# Patient Record
Sex: FEMALE | Race: WHITE | NOT HISPANIC OR LATINO | Employment: FULL TIME
[De-identification: names, ages, dates, MRNs, and addresses within clinical notes are randomized per-mention and may not be internally consistent; named-entity substitution may affect disease eponyms.]

---

## 2017-10-15 ENCOUNTER — HEALTH MAINTENANCE LETTER (OUTPATIENT)
Age: 37
End: 2017-10-15

## 2018-02-09 ENCOUNTER — ANESTHESIA (OUTPATIENT)
Dept: SURGERY | Facility: CLINIC | Age: 38
End: 2018-02-09
Payer: COMMERCIAL

## 2018-02-09 ENCOUNTER — ANESTHESIA EVENT (OUTPATIENT)
Dept: SURGERY | Facility: CLINIC | Age: 38
End: 2018-02-09
Payer: COMMERCIAL

## 2018-02-09 ENCOUNTER — APPOINTMENT (OUTPATIENT)
Dept: CT IMAGING | Facility: CLINIC | Age: 38
End: 2018-02-09
Attending: EMERGENCY MEDICINE
Payer: COMMERCIAL

## 2018-02-09 ENCOUNTER — APPOINTMENT (OUTPATIENT)
Dept: ULTRASOUND IMAGING | Facility: CLINIC | Age: 38
End: 2018-02-09
Attending: EMERGENCY MEDICINE
Payer: COMMERCIAL

## 2018-02-09 ENCOUNTER — HOSPITAL ENCOUNTER (OUTPATIENT)
Facility: CLINIC | Age: 38
Discharge: HOME | End: 2018-02-09
Attending: EMERGENCY MEDICINE | Admitting: OBSTETRICS & GYNECOLOGY
Payer: COMMERCIAL

## 2018-02-09 VITALS
OXYGEN SATURATION: 94 % | BODY MASS INDEX: 31.41 KG/M2 | WEIGHT: 184 LBS | SYSTOLIC BLOOD PRESSURE: 104 MMHG | TEMPERATURE: 98.6 F | HEART RATE: 76 BPM | HEIGHT: 64 IN | DIASTOLIC BLOOD PRESSURE: 71 MMHG | RESPIRATION RATE: 16 BRPM

## 2018-02-09 DIAGNOSIS — Z98.890 S/P LAPAROSCOPY: Primary | ICD-10-CM

## 2018-02-09 DIAGNOSIS — R10.31 RLQ ABDOMINAL PAIN: ICD-10-CM

## 2018-02-09 DIAGNOSIS — N83.8 OVARIAN ENLARGEMENT, RIGHT: ICD-10-CM

## 2018-02-09 LAB
ALBUMIN SERPL-MCNC: 4.1 G/DL (ref 3.4–5)
ALBUMIN UR-MCNC: NEGATIVE MG/DL
ALP SERPL-CCNC: 58 U/L (ref 40–150)
ALT SERPL W P-5'-P-CCNC: 17 U/L (ref 0–50)
ANION GAP SERPL CALCULATED.3IONS-SCNC: 9 MMOL/L (ref 3–14)
APPEARANCE UR: CLEAR
AST SERPL W P-5'-P-CCNC: 12 U/L (ref 0–45)
BASOPHILS # BLD AUTO: 0 10E9/L (ref 0–0.2)
BASOPHILS NFR BLD AUTO: 0.2 %
BILIRUB SERPL-MCNC: 0.4 MG/DL (ref 0.2–1.3)
BILIRUB UR QL STRIP: NEGATIVE
BUN SERPL-MCNC: 10 MG/DL (ref 7–30)
CALCIUM SERPL-MCNC: 8.7 MG/DL (ref 8.5–10.1)
CHLORIDE SERPL-SCNC: 106 MMOL/L (ref 94–109)
CO2 SERPL-SCNC: 22 MMOL/L (ref 20–32)
COLOR UR AUTO: NORMAL
CREAT SERPL-MCNC: 0.64 MG/DL (ref 0.52–1.04)
DIFFERENTIAL METHOD BLD: NORMAL
EOSINOPHIL # BLD AUTO: 0.1 10E9/L (ref 0–0.7)
EOSINOPHIL NFR BLD AUTO: 2.5 %
ERYTHROCYTE [DISTWIDTH] IN BLOOD BY AUTOMATED COUNT: 13.1 % (ref 10–15)
GFR SERPL CREATININE-BSD FRML MDRD: >90 ML/MIN/1.7M2
GLUCOSE SERPL-MCNC: 102 MG/DL (ref 70–99)
GLUCOSE UR STRIP-MCNC: NEGATIVE MG/DL
HCG UR QL: NEGATIVE
HCT VFR BLD AUTO: 37.8 % (ref 35–47)
HGB BLD-MCNC: 13.3 G/DL (ref 11.7–15.7)
HGB UR QL STRIP: NEGATIVE
IMM GRANULOCYTES # BLD: 0 10E9/L (ref 0–0.4)
IMM GRANULOCYTES NFR BLD: 0.2 %
KETONES UR STRIP-MCNC: NEGATIVE MG/DL
LEUKOCYTE ESTERASE UR QL STRIP: NEGATIVE
LYMPHOCYTES # BLD AUTO: 1.4 10E9/L (ref 0.8–5.3)
LYMPHOCYTES NFR BLD AUTO: 26 %
MCH RBC QN AUTO: 32.4 PG (ref 26.5–33)
MCHC RBC AUTO-ENTMCNC: 35.2 G/DL (ref 31.5–36.5)
MCV RBC AUTO: 92 FL (ref 78–100)
MONOCYTES # BLD AUTO: 0.5 10E9/L (ref 0–1.3)
MONOCYTES NFR BLD AUTO: 9.4 %
NEUTROPHILS # BLD AUTO: 3.2 10E9/L (ref 1.6–8.3)
NEUTROPHILS NFR BLD AUTO: 61.7 %
NITRATE UR QL: NEGATIVE
NRBC # BLD AUTO: 0 10*3/UL
NRBC BLD AUTO-RTO: 0 /100
PH UR STRIP: 6 PH (ref 5–7)
PLATELET # BLD AUTO: 210 10E9/L (ref 150–450)
POTASSIUM SERPL-SCNC: 3.9 MMOL/L (ref 3.4–5.3)
PROT SERPL-MCNC: 7.5 G/DL (ref 6.8–8.8)
RBC # BLD AUTO: 4.11 10E12/L (ref 3.8–5.2)
SODIUM SERPL-SCNC: 137 MMOL/L (ref 133–144)
SOURCE: NORMAL
SP GR UR STRIP: 1 (ref 1–1.03)
UROBILINOGEN UR STRIP-MCNC: NORMAL MG/DL (ref 0–2)
WBC # BLD AUTO: 5.2 10E9/L (ref 4–11)

## 2018-02-09 PROCEDURE — 37000008 ZZH ANESTHESIA TECHNICAL FEE, 1ST 30 MIN: Performed by: OBSTETRICS & GYNECOLOGY

## 2018-02-09 PROCEDURE — 25000128 H RX IP 250 OP 636: Performed by: EMERGENCY MEDICINE

## 2018-02-09 PROCEDURE — 25800025 ZZH RX 258: Performed by: OBSTETRICS & GYNECOLOGY

## 2018-02-09 PROCEDURE — 37000009 ZZH ANESTHESIA TECHNICAL FEE, EACH ADDTL 15 MIN: Performed by: OBSTETRICS & GYNECOLOGY

## 2018-02-09 PROCEDURE — 27210794 ZZH OR GENERAL SUPPLY STERILE: Performed by: OBSTETRICS & GYNECOLOGY

## 2018-02-09 PROCEDURE — 96361 HYDRATE IV INFUSION ADD-ON: CPT | Mod: 91

## 2018-02-09 PROCEDURE — 40000170 ZZH STATISTIC PRE-PROCEDURE ASSESSMENT II: Performed by: OBSTETRICS & GYNECOLOGY

## 2018-02-09 PROCEDURE — 81003 URINALYSIS AUTO W/O SCOPE: CPT | Performed by: EMERGENCY MEDICINE

## 2018-02-09 PROCEDURE — 88305 TISSUE EXAM BY PATHOLOGIST: CPT | Mod: 26 | Performed by: OBSTETRICS & GYNECOLOGY

## 2018-02-09 PROCEDURE — 80053 COMPREHEN METABOLIC PANEL: CPT | Performed by: EMERGENCY MEDICINE

## 2018-02-09 PROCEDURE — 25000125 ZZHC RX 250: Performed by: OBSTETRICS & GYNECOLOGY

## 2018-02-09 PROCEDURE — 71000027 ZZH RECOVERY PHASE 2 EACH 15 MINS: Performed by: OBSTETRICS & GYNECOLOGY

## 2018-02-09 PROCEDURE — 96375 TX/PRO/DX INJ NEW DRUG ADDON: CPT

## 2018-02-09 PROCEDURE — 25000128 H RX IP 250 OP 636: Performed by: ANESTHESIOLOGY

## 2018-02-09 PROCEDURE — 25000128 H RX IP 250 OP 636: Performed by: OBSTETRICS & GYNECOLOGY

## 2018-02-09 PROCEDURE — 81025 URINE PREGNANCY TEST: CPT | Performed by: EMERGENCY MEDICINE

## 2018-02-09 PROCEDURE — 36000056 ZZH SURGERY LEVEL 3 1ST 30 MIN: Performed by: OBSTETRICS & GYNECOLOGY

## 2018-02-09 PROCEDURE — 71000012 ZZH RECOVERY PHASE 1 LEVEL 1 FIRST HR: Performed by: OBSTETRICS & GYNECOLOGY

## 2018-02-09 PROCEDURE — 96374 THER/PROPH/DIAG INJ IV PUSH: CPT | Mod: 59

## 2018-02-09 PROCEDURE — 99285 EMERGENCY DEPT VISIT HI MDM: CPT | Mod: 25

## 2018-02-09 PROCEDURE — 74176 CT ABD & PELVIS W/O CONTRAST: CPT

## 2018-02-09 PROCEDURE — 25000125 ZZHC RX 250: Performed by: NURSE ANESTHETIST, CERTIFIED REGISTERED

## 2018-02-09 PROCEDURE — 85025 COMPLETE CBC W/AUTO DIFF WBC: CPT | Performed by: EMERGENCY MEDICINE

## 2018-02-09 PROCEDURE — 27210995 ZZH RX 272: Performed by: OBSTETRICS & GYNECOLOGY

## 2018-02-09 PROCEDURE — 76830 TRANSVAGINAL US NON-OB: CPT

## 2018-02-09 PROCEDURE — 25000565 ZZH ISOFLURANE, EA 15 MIN: Performed by: OBSTETRICS & GYNECOLOGY

## 2018-02-09 PROCEDURE — 71000013 ZZH RECOVERY PHASE 1 LEVEL 1 EA ADDTL HR: Performed by: OBSTETRICS & GYNECOLOGY

## 2018-02-09 PROCEDURE — 25000128 H RX IP 250 OP 636: Performed by: NURSE ANESTHETIST, CERTIFIED REGISTERED

## 2018-02-09 PROCEDURE — 36000058 ZZH SURGERY LEVEL 3 EA 15 ADDTL MIN: Performed by: OBSTETRICS & GYNECOLOGY

## 2018-02-09 PROCEDURE — 88305 TISSUE EXAM BY PATHOLOGIST: CPT | Performed by: OBSTETRICS & GYNECOLOGY

## 2018-02-09 RX ORDER — LIDOCAINE HYDROCHLORIDE 20 MG/ML
INJECTION, SOLUTION INFILTRATION; PERINEURAL PRN
Status: DISCONTINUED | OUTPATIENT
Start: 2018-02-09 | End: 2018-02-09

## 2018-02-09 RX ORDER — ACETAMINOPHEN 10 MG/ML
1000 INJECTION, SOLUTION INTRAVENOUS ONCE
Status: COMPLETED | OUTPATIENT
Start: 2018-02-09 | End: 2018-02-09

## 2018-02-09 RX ORDER — ONDANSETRON 2 MG/ML
INJECTION INTRAMUSCULAR; INTRAVENOUS PRN
Status: DISCONTINUED | OUTPATIENT
Start: 2018-02-09 | End: 2018-02-09

## 2018-02-09 RX ORDER — SODIUM CHLORIDE 9 MG/ML
1000 INJECTION, SOLUTION INTRAVENOUS CONTINUOUS
Status: DISCONTINUED | OUTPATIENT
Start: 2018-02-09 | End: 2018-02-09 | Stop reason: HOSPADM

## 2018-02-09 RX ORDER — SODIUM CHLORIDE, SODIUM LACTATE, POTASSIUM CHLORIDE, AND CALCIUM CHLORIDE .6; .31; .03; .02 G/100ML; G/100ML; G/100ML; G/100ML
IRRIGANT IRRIGATION PRN
Status: DISCONTINUED | OUTPATIENT
Start: 2018-02-09 | End: 2018-02-09 | Stop reason: HOSPADM

## 2018-02-09 RX ORDER — VECURONIUM BROMIDE 1 MG/ML
INJECTION, POWDER, LYOPHILIZED, FOR SOLUTION INTRAVENOUS PRN
Status: DISCONTINUED | OUTPATIENT
Start: 2018-02-09 | End: 2018-02-09

## 2018-02-09 RX ORDER — KETOROLAC TROMETHAMINE 30 MG/ML
INJECTION, SOLUTION INTRAMUSCULAR; INTRAVENOUS PRN
Status: DISCONTINUED | OUTPATIENT
Start: 2018-02-09 | End: 2018-02-09

## 2018-02-09 RX ORDER — HYDROCODONE BITARTRATE AND ACETAMINOPHEN 5; 325 MG/1; MG/1
1-2 TABLET ORAL EVERY 4 HOURS PRN
Qty: 20 TABLET | Refills: 0 | Status: SHIPPED | OUTPATIENT
Start: 2018-02-09 | End: 2023-11-07

## 2018-02-09 RX ORDER — ONDANSETRON 2 MG/ML
4 INJECTION INTRAMUSCULAR; INTRAVENOUS ONCE
Status: COMPLETED | OUTPATIENT
Start: 2018-02-09 | End: 2018-02-09

## 2018-02-09 RX ORDER — SODIUM CHLORIDE, SODIUM LACTATE, POTASSIUM CHLORIDE, CALCIUM CHLORIDE 600; 310; 30; 20 MG/100ML; MG/100ML; MG/100ML; MG/100ML
INJECTION, SOLUTION INTRAVENOUS CONTINUOUS
Status: DISCONTINUED | OUTPATIENT
Start: 2018-02-09 | End: 2018-02-09 | Stop reason: HOSPADM

## 2018-02-09 RX ORDER — MAGNESIUM HYDROXIDE 1200 MG/15ML
LIQUID ORAL PRN
Status: DISCONTINUED | OUTPATIENT
Start: 2018-02-09 | End: 2018-02-09 | Stop reason: HOSPADM

## 2018-02-09 RX ORDER — OXYCODONE HYDROCHLORIDE 5 MG/1
5 TABLET ORAL
Status: DISCONTINUED | OUTPATIENT
Start: 2018-02-09 | End: 2018-02-09 | Stop reason: HOSPADM

## 2018-02-09 RX ORDER — NALOXONE HYDROCHLORIDE 0.4 MG/ML
.1-.4 INJECTION, SOLUTION INTRAMUSCULAR; INTRAVENOUS; SUBCUTANEOUS
Status: DISCONTINUED | OUTPATIENT
Start: 2018-02-09 | End: 2018-02-09 | Stop reason: HOSPADM

## 2018-02-09 RX ORDER — FENTANYL CITRATE 0.05 MG/ML
25-50 INJECTION, SOLUTION INTRAMUSCULAR; INTRAVENOUS
Status: DISCONTINUED | OUTPATIENT
Start: 2018-02-09 | End: 2018-02-09 | Stop reason: HOSPADM

## 2018-02-09 RX ORDER — MORPHINE SULFATE 2 MG/ML
2 INJECTION, SOLUTION INTRAMUSCULAR; INTRAVENOUS
Status: DISCONTINUED | OUTPATIENT
Start: 2018-02-09 | End: 2018-02-09 | Stop reason: HOSPADM

## 2018-02-09 RX ORDER — GLYCOPYRROLATE 0.2 MG/ML
INJECTION, SOLUTION INTRAMUSCULAR; INTRAVENOUS PRN
Status: DISCONTINUED | OUTPATIENT
Start: 2018-02-09 | End: 2018-02-09

## 2018-02-09 RX ORDER — PROPOFOL 10 MG/ML
INJECTION, EMULSION INTRAVENOUS PRN
Status: DISCONTINUED | OUTPATIENT
Start: 2018-02-09 | End: 2018-02-09

## 2018-02-09 RX ORDER — ONDANSETRON 2 MG/ML
4 INJECTION INTRAMUSCULAR; INTRAVENOUS EVERY 30 MIN PRN
Status: DISCONTINUED | OUTPATIENT
Start: 2018-02-09 | End: 2018-02-09 | Stop reason: HOSPADM

## 2018-02-09 RX ORDER — HYDROMORPHONE HYDROCHLORIDE 1 MG/ML
.3-.5 INJECTION, SOLUTION INTRAMUSCULAR; INTRAVENOUS; SUBCUTANEOUS EVERY 5 MIN PRN
Status: DISCONTINUED | OUTPATIENT
Start: 2018-02-09 | End: 2018-02-09 | Stop reason: HOSPADM

## 2018-02-09 RX ORDER — BUPIVACAINE HYDROCHLORIDE 5 MG/ML
INJECTION, SOLUTION PERINEURAL PRN
Status: DISCONTINUED | OUTPATIENT
Start: 2018-02-09 | End: 2018-02-09 | Stop reason: HOSPADM

## 2018-02-09 RX ORDER — DEXAMETHASONE SODIUM PHOSPHATE 4 MG/ML
INJECTION, SOLUTION INTRA-ARTICULAR; INTRALESIONAL; INTRAMUSCULAR; INTRAVENOUS; SOFT TISSUE PRN
Status: DISCONTINUED | OUTPATIENT
Start: 2018-02-09 | End: 2018-02-09

## 2018-02-09 RX ORDER — ONDANSETRON 4 MG/1
4 TABLET, ORALLY DISINTEGRATING ORAL EVERY 30 MIN PRN
Status: DISCONTINUED | OUTPATIENT
Start: 2018-02-09 | End: 2018-02-09 | Stop reason: HOSPADM

## 2018-02-09 RX ORDER — ONDANSETRON 4 MG/1
4 TABLET, ORALLY DISINTEGRATING ORAL
Status: DISCONTINUED | OUTPATIENT
Start: 2018-02-09 | End: 2018-02-09 | Stop reason: HOSPADM

## 2018-02-09 RX ORDER — KETOROLAC TROMETHAMINE 15 MG/ML
15 INJECTION, SOLUTION INTRAMUSCULAR; INTRAVENOUS ONCE
Status: COMPLETED | OUTPATIENT
Start: 2018-02-09 | End: 2018-02-09

## 2018-02-09 RX ORDER — NEOSTIGMINE METHYLSULFATE 1 MG/ML
VIAL (ML) INJECTION PRN
Status: DISCONTINUED | OUTPATIENT
Start: 2018-02-09 | End: 2018-02-09

## 2018-02-09 RX ORDER — FENTANYL CITRATE 50 UG/ML
INJECTION, SOLUTION INTRAMUSCULAR; INTRAVENOUS PRN
Status: DISCONTINUED | OUTPATIENT
Start: 2018-02-09 | End: 2018-02-09

## 2018-02-09 RX ADMIN — LIDOCAINE HYDROCHLORIDE 80 MG: 20 INJECTION, SOLUTION INFILTRATION; PERINEURAL at 12:46

## 2018-02-09 RX ADMIN — SUCCINYLCHOLINE CHLORIDE 100 MG: 20 INJECTION, SOLUTION INTRAMUSCULAR; INTRAVENOUS; PARENTERAL at 12:46

## 2018-02-09 RX ADMIN — SODIUM CHLORIDE, POTASSIUM CHLORIDE, SODIUM LACTATE AND CALCIUM CHLORIDE: 600; 310; 30; 20 INJECTION, SOLUTION INTRAVENOUS at 12:06

## 2018-02-09 RX ADMIN — GLYCOPYRROLATE 0.6 MG: 0.2 INJECTION, SOLUTION INTRAMUSCULAR; INTRAVENOUS at 14:00

## 2018-02-09 RX ADMIN — ONDANSETRON 4 MG: 2 INJECTION INTRAMUSCULAR; INTRAVENOUS at 17:26

## 2018-02-09 RX ADMIN — HYDROMORPHONE HYDROCHLORIDE 0.5 MG: 1 INJECTION, SOLUTION INTRAMUSCULAR; INTRAVENOUS; SUBCUTANEOUS at 15:16

## 2018-02-09 RX ADMIN — KETOROLAC TROMETHAMINE 15 MG: 15 INJECTION, SOLUTION INTRAMUSCULAR; INTRAVENOUS at 10:19

## 2018-02-09 RX ADMIN — MIDAZOLAM 2 MG: 1 INJECTION INTRAMUSCULAR; INTRAVENOUS at 12:46

## 2018-02-09 RX ADMIN — ONDANSETRON 4 MG: 2 INJECTION INTRAMUSCULAR; INTRAVENOUS at 14:01

## 2018-02-09 RX ADMIN — PROPOFOL 200 MG: 10 INJECTION, EMULSION INTRAVENOUS at 12:46

## 2018-02-09 RX ADMIN — NEOSTIGMINE METHYLSULFATE 4 MG: 1 INJECTION INTRAMUSCULAR; INTRAVENOUS; SUBCUTANEOUS at 14:00

## 2018-02-09 RX ADMIN — VECURONIUM BROMIDE 3 MG: 1 INJECTION, POWDER, LYOPHILIZED, FOR SOLUTION INTRAVENOUS at 12:56

## 2018-02-09 RX ADMIN — SODIUM CHLORIDE 1000 ML: 9 INJECTION, SOLUTION INTRAVENOUS at 11:33

## 2018-02-09 RX ADMIN — SODIUM CHLORIDE, POTASSIUM CHLORIDE, SODIUM LACTATE AND CALCIUM CHLORIDE: 600; 310; 30; 20 INJECTION, SOLUTION INTRAVENOUS at 13:24

## 2018-02-09 RX ADMIN — SODIUM CHLORIDE, POTASSIUM CHLORIDE, SODIUM LACTATE AND CALCIUM CHLORIDE: 600; 310; 30; 20 INJECTION, SOLUTION INTRAVENOUS at 12:42

## 2018-02-09 RX ADMIN — FENTANYL CITRATE 50 MCG: 50 INJECTION, SOLUTION INTRAMUSCULAR; INTRAVENOUS at 17:03

## 2018-02-09 RX ADMIN — ONDANSETRON 4 MG: 2 INJECTION INTRAMUSCULAR; INTRAVENOUS at 08:04

## 2018-02-09 RX ADMIN — DEXAMETHASONE SODIUM PHOSPHATE 4 MG: 4 INJECTION, SOLUTION INTRA-ARTICULAR; INTRALESIONAL; INTRAMUSCULAR; INTRAVENOUS; SOFT TISSUE at 13:00

## 2018-02-09 RX ADMIN — FENTANYL CITRATE 50 MCG: 50 INJECTION, SOLUTION INTRAMUSCULAR; INTRAVENOUS at 13:45

## 2018-02-09 RX ADMIN — HYDROMORPHONE HYDROCHLORIDE 0.5 MG: 1 INJECTION, SOLUTION INTRAMUSCULAR; INTRAVENOUS; SUBCUTANEOUS at 15:47

## 2018-02-09 RX ADMIN — KETOROLAC TROMETHAMINE 30 MG: 30 INJECTION, SOLUTION INTRAMUSCULAR at 14:01

## 2018-02-09 RX ADMIN — HYDROMORPHONE HYDROCHLORIDE 0.5 MG: 1 INJECTION, SOLUTION INTRAMUSCULAR; INTRAVENOUS; SUBCUTANEOUS at 14:35

## 2018-02-09 RX ADMIN — PROPOFOL 40 MG: 10 INJECTION, EMULSION INTRAVENOUS at 13:45

## 2018-02-09 RX ADMIN — SODIUM CHLORIDE 1000 ML: 9 INJECTION, SOLUTION INTRAVENOUS at 08:08

## 2018-02-09 RX ADMIN — ACETAMINOPHEN 1000 MG: 10 INJECTION, SOLUTION INTRAVENOUS at 16:07

## 2018-02-09 RX ADMIN — VECURONIUM BROMIDE 1 MG: 1 INJECTION, POWDER, LYOPHILIZED, FOR SOLUTION INTRAVENOUS at 13:25

## 2018-02-09 RX ADMIN — FENTANYL CITRATE 250 MCG: 50 INJECTION, SOLUTION INTRAMUSCULAR; INTRAVENOUS at 13:02

## 2018-02-09 RX ADMIN — FENTANYL CITRATE 100 MCG: 50 INJECTION, SOLUTION INTRAMUSCULAR; INTRAVENOUS at 12:46

## 2018-02-09 ASSESSMENT — ENCOUNTER SYMPTOMS
CONSTIPATION: 0
BLOOD IN STOOL: 0
DIARRHEA: 0
BACK PAIN: 1
ABDOMINAL PAIN: 1
SEIZURES: 0

## 2018-02-09 NOTE — IP AVS SNAPSHOT
MRN:[id number]                      After Visit Summary   2/9/2018    Cora Connell    MRN: [id number]           Thank you!     Thank you for choosing [facility] for your care. Our goal is always to provide you with excellent care. Hearing back from our patients is one way we can continue to improve our services. Please take a few minutes to complete the written survey that you may receive in the mail after you visit with us. Thank you!        Patient Information     Date Of Birth          1980        Designated Caregiver       Most Recent Value    Caregiver    Will someone help with your care after discharge? yes    Name of designated caregiver Timoteo-    Phone number of caregiver [phone]    Caregiver address [address]      About your hospital stay     You were admitted on:  N/A You last received care in the:  [facility] PACU    You were discharged on:  February 9, 2018       Who to Call     For medical emergencies, please call 911.  For non-urgent questions about your medical care, please call your primary care provider or clinic, [phone]  For questions related to your surgery, please call your surgery clinic        Attending Provider     Provider Specialty    Nikita Herring MD Emergency Medicine       Primary Care Provider Office Phone # Fax #    Loiad Bynum [phone]      After Care Instructions     Discharge Instructions       Resume pre procedure diet            Discharge Instructions       Patient to arrange follow up appointment in 2  weeks            Discharge Instructions       Stop turmeric for two weeks postoperatively            Dressing       Keep dressing clean and dry, change as instructed by Provider or RN            No alcohol       NO ALCOHOL for 24 hours post procedure            No driving or operating machinery       No driving or operating machinery until day after procedure            No  lifting       No lifting over 30 pounds and no strenuous physical activity.  For 2 weeks                  Further instructions from your care team         Same Day Surgery Discharge Instructions for  Sedation and General Anesthesia       It's not unusual to feel dizzy, light-headed or faint for up to 24 hours after surgery or while taking pain medication.  If you have these symptoms: sit for a few minutes before standing and have someone assist you when you get up to walk or use the bathroom.      You should rest and relax for the next 24 hours. We recommend you make arrangements to have an adult stay with you for at least 24 hours after your discharge.  Avoid hazardous and strenuous activity.      DO NOT DRIVE any vehicle or operate mechanical equipment for 24 hours following the end of your surgery.  Even though you may feel normal, your reactions may be affected by the medication you have received.      Do not drink alcoholic beverages for 24 hours following surgery.       Slowly progress to your regular diet as you feel able. It's not unusual to feel nauseated and/or vomit after receiving anesthesia.  If you develop these symptoms, drink clear liquids (apple juice, ginger ale, broth, 7-up, etc. ) until you feel better.  If your nausea and vomiting persists for 24 hours, please notify your surgeon.        All narcotic pain medications, along with inactivity and anesthesia, can cause constipation. Drinking plenty of liquids and increasing fiber intake will help.      For any questions of a medical nature, call your surgeon.      Do not make important decisions for 24 hours.      If you had general anesthesia, you may have a sore throat for a couple of days related to the breathing tube used during surgery.  You may use Cepacol lozenges to help with this discomfort.  If it worsens or if you develop a fever, contact your surgeon.       If you feel your pain is not well managed with the pain medications prescribed by  your surgeon, please contact your surgeon's office to let them know so they can address your concerns.     [facility]   Laparoscopy  Discharge Instructions    ACTIVITY:  You may restart normal activities as your abdominal discomfort disappears.  You may expect some discomfort under the ribs and shoulder area for the first 24 hours.  In nearly all cases, this will disappear shortly after the first day.  It is certainly permissible to climb stairs, shower, and do ordinary, quiet activities.  More vigorous activities such as sports, intercourse and work may be resumed in 48-72 hours as seems to befit your situation.    OFFICE VISIT:  Please call a day or two after your surgery to make an appointment in approximately 2 weeks to discuss the results of your surgery and have your check-up.    VAGINAL DISCHARGE:  You may have some bloody vaginal discharge for as long as one week.  Ordinary tampons may be used after 3-4 days.  Avoid super absorbent tampons.    TEMPERATURE:  If you develop a temperature elevation of 101  or higher, please call our office immediately.    RESTRICTIONS:  Due to the effects of general anesthesia, please do not drive a car, drink alcoholic beverages, nor operate complex machinery in the first 24 hours following surgery.    PLEASE FEEL FREE TO CALL OUR OFFICE IF ANY QUESTIONS OR PROBLEMS ARISE.    [facility]    Marcelino Bennett M.D.  Bernard Whitney M.D.   Benito Beckwith M.D.  KMIANI Carlin M.D.   Glenda Fernandez M.D.  Xiomara Torres M.D.  Aria Hines D.O.  Latasha Arceo M.D. KIMANI Ramey M.D.  _________________________________________________________________________________                   [facility]              [address]  [address] [address]  [address]            [facility] [address]  [place] [address]             "[phone] (Telephone)                                               [phone] (Telephone)            [phone] (Fax)  [phone] (Fax)            [facility]    **If you have questions or concerns about your procedure,  call Dr. Arceo at [phone]**      While you were at the hospital today you received Toradol, an antiinflammatory medication similar to Ibuprofen.  You should not take other antiinflammatory medication, such as Ibuprofen, Motrin, Advil, Aleve, Naprosyn, etc, until 8:00pm.       While you were at the hospital today you were given 1000 mg of Tylenol at 4pm. The recommended daily maximum dose is 4000 mg.       Pending Results     Date and Time Order Name Status Description    2018 1318 Surgical pathology exam In process             Admission Information     Date & Time Department Dept. Phone    2018 [facility] PACU [phone]      Your Vitals Were     Blood Pressure Pulse Temperature Respirations Height Weight    98/62 76 98.6  F (37  C) 10 1.626 m (5' 4\") 83.5 kg (184 lb)    Last Period Pulse Oximetry BMI (Body Mass Index)             2018 97% 31.58 kg/m2         [employer] Information     [employer] lets you send messages to your doctor, view your test results, renew your prescriptions, schedule appointments and more. To sign up, go to [email or web address] . Click on \"Log in\" on the left side of the screen, which will take you to the Welcome page. Then click on \"Sign up Now\" on the right side of the page.     You will be asked to enter the access code listed below, as well as some personal information. Please follow the directions to create your username and password.     Your access code is: [email or web address]  Expires: 5/10/2018  2:09 PM     Your access code will  in 90 days. If you need help or a new code, please call your [place] clinic or [phone].        Care " EveryWhere ID     This is your Care EveryWhere ID. This could be used by other organizations to access your [facility] medical records  [id number]        Equal Access to Services     ARIANNA LUCIA : Joshua Hernández, bibi brewer, vikasmarissa rodrigezanat quezada, paco stevenin hayaachitra chavezmarquez saravia tg acevedoMamadou So [facility] [phone].    ATENCIÓN: Si habla español, tiene a casanova disposición servicios gratuitos de asistencia lingüística. Llame al [phone].    We comply with applicable federal civil rights laws and [place] laws. We do not discriminate on the basis of race, color, national origin, age, disability, sex, sexual orientation, or gender identity.               Review of your medicines      START taking        Dose / Directions    HYDROcodone-acetaminophen 5-325 MG per tablet   Commonly known as:  NORCO   Used for:  S/P laparoscopy        Dose:  1-2 tablet   Take 1-2 tablets by mouth every 4 hours as needed for other (Moderate to Severe Pain)   Quantity:  20 tablet   Refills:  0         CONTINUE these medicines which have NOT CHANGED        Dose / Directions    calcium carbonate 1250 MG tablet   Commonly known as:  OS-RACIEL 500 mg [place]. [place]        Dose:  500 mg   Take 500 mg by mouth 2 times daily   Refills:  0       SERTRALINE HCL        Dose:  50 mg   50 mg   Refills:  0       TURMERIC PO        Take by mouth 2 times daily   Refills:  0            Where to get your medicines      Some of these will need a paper prescription and others can be bought over the counter. Ask your nurse if you have questions.     Bring a paper prescription for each of these medications     HYDROcodone-acetaminophen 5-325 MG per tablet                Protect others around you: Learn how to safely use, store and throw away your medicines at www.disposemymeds.org.        Information about OPIOIDS     PRESCRIPTION OPIOIDS: WHAT YOU NEED TO KNOW    Prescription opioids can be used to help relieve moderate to severe pain and are  often prescribed following a surgery or injury, or for certain health conditions. These medications can be an important part of treatment but also come with serious risks. It is important to work with your health care provider to make sure you are getting the safest, most effective care.    WHAT ARE THE RISKS AND SIDE EFFECTS OF OPIOID USE?  Prescription opioids carry serious risks of addiction and overdose, especially with prolonged use. An opioid overdose, often marked by slowed breathing can cause sudden death. The use of prescription opioids can have a number of side effects as well, even when taken as directed:      Tolerance - meaning you might need to take more of a medication for the same pain relief    Physical dependence - meaning you have symptoms of withdrawal when a medication is stopped    Increased sensitivity to pain    Constipation    Nausea, vomiting, and dry mouth    Sleepiness and dizziness    Confusion    Depression    Low levels of testosterone that can result in lower sex drive, energy, and strength    Itching and sweating    RISKS ARE GREATER WITH:    History of drug misuse, substance use disorder, or overdose    Mental health conditions (such as depression or anxiety)    Sleep apnea    Older age (65 years or older)    Pregnancy    Avoid alcohol while taking prescription opioids.   Also, unless specifically advised by your health care provider, medications to avoid include:    Benzodiazepines (such as Xanax or Valium)    Muscle relaxants (such as Soma or Flexeril)    Hypnotics (such as Ambien or Lunesta)    Other prescription opioids    KNOW YOUR OPTIONS:  Talk to your health care provider about ways to manage your pain that do not involve prescription opioids. Some of these options may actually work better and have fewer risks and side effects:    Pain relievers such as acetaminophen, ibuprofen, and naproxen    Some medications that are also used for depression or seizures    Physical  therapy and exercise    Cognitive behavioral therapy, a psychological, goal-directed approach, in which patients learn how to modify physical, behavioral, and emotional triggers of pain and stress    IF YOU ARE PRESCRIBED OPIOIDS FOR PAIN:    Never take opioids in greater amounts or more often than prescribed    Follow up with your primary health care provider and work together to create a plan on how to manage your pain.    Talk about ways to help manage your pain that do not involve prescription opioids    Talk about all concerns and side effects    Help prevent misuse and abuse    Never sell or share prescription opioids    Never use another person's prescription opioids    Store prescription opioids in a secure place and out of reach of others (this may include visitors, children, friends, and family)    Visit www.cdc.gov/drugoverdose to learn about risks of opioid abuse and overdose    If you believe you may be struggling with addiction, tell your health care provider and ask for guidance or call [facility]'s National Helpline at 5-636-418-HELP    LEARN MORE / www.cdc.gov/drugoverdose/prescribing/guideline.html    Safely dispose of unused prescription opioids: Find your local drug take-back programs and more information about the importance of safe disposal at www.doseofreality.[place].gov             Medication List: This is a list of all your medications and when to take them. Check marks below indicate your daily home schedule. Keep this list as a reference.      Medications           Morning Afternoon Evening Bedtime As Needed    calcium carbonate 1250 MG tablet   Commonly known as:  OS-RACIEL 500 mg [place]. [place]   Take 500 mg by mouth 2 times daily                                HYDROcodone-acetaminophen 5-325 MG per tablet   Commonly known as:  NORCO   Take 1-2 tablets by mouth every 4 hours as needed for other (Moderate to Severe Pain)                                SERTRALINE HCL   50 mg                                 TURMERIC PO   Take by mouth 2 times daily

## 2018-02-09 NOTE — DISCHARGE INSTRUCTIONS
Same Day Surgery Discharge Instructions for  Sedation and General Anesthesia       It's not unusual to feel dizzy, light-headed or faint for up to 24 hours after surgery or while taking pain medication.  If you have these symptoms: sit for a few minutes before standing and have someone assist you when you get up to walk or use the bathroom.      You should rest and relax for the next 24 hours. We recommend you make arrangements to have an adult stay with you for at least 24 hours after your discharge.  Avoid hazardous and strenuous activity.      DO NOT DRIVE any vehicle or operate mechanical equipment for 24 hours following the end of your surgery.  Even though you may feel normal, your reactions may be affected by the medication you have received.      Do not drink alcoholic beverages for 24 hours following surgery.       Slowly progress to your regular diet as you feel able. It's not unusual to feel nauseated and/or vomit after receiving anesthesia.  If you develop these symptoms, drink clear liquids (apple juice, ginger ale, broth, 7-up, etc. ) until you feel better.  If your nausea and vomiting persists for 24 hours, please notify your surgeon.        All narcotic pain medications, along with inactivity and anesthesia, can cause constipation. Drinking plenty of liquids and increasing fiber intake will help.      For any questions of a medical nature, call your surgeon.      Do not make important decisions for 24 hours.      If you had general anesthesia, you may have a sore throat for a couple of days related to the breathing tube used during surgery.  You may use Cepacol lozenges to help with this discomfort.  If it worsens or if you develop a fever, contact your surgeon.       If you feel your pain is not well managed with the pain medications prescribed by your surgeon, please contact your surgeon's office to let them know so they can address your concerns.     [facility]    Laparoscopy  Discharge Instructions    ACTIVITY:  You may restart normal activities as your abdominal discomfort disappears.  You may expect some discomfort under the ribs and shoulder area for the first 24 hours.  In nearly all cases, this will disappear shortly after the first day.  It is certainly permissible to climb stairs, shower, and do ordinary, quiet activities.  More vigorous activities such as sports, intercourse and work may be resumed in 48-72 hours as seems to befit your situation.    OFFICE VISIT:  Please call a day or two after your surgery to make an appointment in approximately 2 weeks to discuss the results of your surgery and have your check-up.    VAGINAL DISCHARGE:  You may have some bloody vaginal discharge for as long as one week.  Ordinary tampons may be used after 3-4 days.  Avoid super absorbent tampons.    TEMPERATURE:  If you develop a temperature elevation of 101  or higher, please call our office immediately.    RESTRICTIONS:  Due to the effects of general anesthesia, please do not drive a car, drink alcoholic beverages, nor operate complex machinery in the first 24 hours following surgery.    PLEASE FEEL FREE TO CALL OUR OFFICE IF ANY QUESTIONS OR PROBLEMS ARISE.    [facility]    Marcelino Bennett M.D.  Bernard Whitney M.D.   Benito Beckwith M.D.  KIMANI Carlin M.D.   Glenda Fernandez M.D.  Xiomara Torres M.D.  JEREL Giang M.D. KIMANI Ramey M.D.  _________________________________________________________________________________                   [facility]              [address]  [address] [address]  [address]            [facility] [address]  [place] [address]            [phone] (Telephone)                                               [phone] (Telephone)            [phone] (Fax)  [phone]  (Fax)            [facility]    **If you have questions or concerns about your procedure,  call Dr. Arceo at [phone]**      While you were at the hospital today you received Toradol, an antiinflammatory medication similar to Ibuprofen.  You should not take other antiinflammatory medication, such as Ibuprofen, Motrin, Advil, Aleve, Naprosyn, etc, until 8:00pm.       While you were at the hospital today you were given 1000 mg of Tylenol at 4pm. The recommended daily maximum dose is 4000 mg.

## 2018-02-09 NOTE — ED PROVIDER NOTES
"  History     Chief Complaint:  Abdominal Pain     The history is provided by the patient.      Cora Connell is a 37 year old female who presents to the ED for evaluation of abdominal pain. Starting yesterday morning, the patient had intermittent bilateral back pain that wrapped around to that RLQ. She took 3 tablets of ibuprofen without relief before going to work.  Throughout the day, her pain felt like she was \"in labor\". Yesterday evening at 2000, she took more ibuprofen with little relief. At 0200 this morning, she woke with severe RLQ pain with nausea. She took ibuprofen with no relief. She comes to the ED this morning concerned for possible kidney stones or appendicitis.     Here in the ED, the patient reports that she continues to have abdominal pain, but does not currently have back pain. She denies having constipation, diarrhea, blood in stool, or vaginal pain or discharge. She does have her appendix. Of note, her father has a history of kidney stones.       Allergies:  Penicillins  Ancef [Cefazolin Sodium]    Medications:    Sertraline    Past Medical History:    Abnormal Pap smear  Postpartum depression  Varicosities    Past Surgical History:    Excise lesion upper extremity  Tonsillectomy  leep tx, cervical     Family History:    Father: kidney stones    Social History:  Presents alone.   Negative for tobacco use.  Negative for alcohol use.  Marital Status:   [2]    Review of Systems   Gastrointestinal: Positive for abdominal pain. Negative for blood in stool, constipation and diarrhea.   Genitourinary: Negative for vaginal bleeding, vaginal discharge and vaginal pain.   Musculoskeletal: Positive for back pain.   All other systems reviewed and are negative.    Physical Exam   First Vitals:  Patient Vitals for the past 24 hrs:   BP Temp Temp src Pulse Heart Rate Resp SpO2 Height Weight   02/09/18 1157 120/72 96.5  F (35.8  C) Temporal 76 - 16 - - -   02/09/18 1153 - - - - - - 96 % - - " "  02/09/18 1134 119/76 - - 77 - 16 - - -   02/09/18 1133 - - - - - - - 1.626 m (5' 4\") 83.5 kg (184 lb)   02/09/18 1023 (!) 122/91 - - 81 - 16 - - -   02/09/18 0739 128/90 98.6  F (37  C) Oral - 89 16 98 % 1.626 m (5' 4\") 83.5 kg (184 lb)       Physical Exam  General: Alert and Interactive.   Head: No signs of trauma.   Mouth/Throat: Oropharynx is clear and moist.   Eyes: Conjunctivae are normal. Pupils are equal, round, and reactive to light.   Neck: Normal range of motion. No nuchal rigidity.   CV: Normal rate and regular rhythm.    Resp: Effort normal and breath sounds normal. No respiratory distress.   GI: Soft. There is no guarding. RLQ tenderness, mild  MSK: Normal range of motion. no edema.   Neuro: The patient is alert and oriented to person, place, and time.  PERRLA, EOMI, strength in upper/lower extremities normal and symmetrical.   Sensation normal. Speech normal.  GCS eye subscore is 4. GCS verbal subscore is 5. GCS motor subscore is 6.   Skin: Skin is warm and dry. No rash noted.   Psych: normal mood and affect. behavior is normal.     Emergency Department Course   Imaging:  Radiographic findings were communicated with the patient who voiced understanding of the findings.    CT Abdomen Pelvis without contrast-stone protocol:   IMPRESSION:   1. Large ovoid soft tissue structure along the superior aspect of the  uterus that is concerning for an ovarian structure. Given its size  this is concerning for large ovarian cyst and ovarian torsion should  be considered given the patient's pain. Recommend further evaluation  with pelvic ultrasound. Other etiologies for this structure are not  excluded, including possibly a subserosal fibroid.  2. No other acute abnormalities in the abdomen or pelvis. Appendix  appears normal. No evidence of renal stone. As per radiology.      US Pelvic complete with transvaginal and Abdomen/Pelvis Duplex, limited:  IMPRESSION:   1. Enlarged right ovary measuring up to 8.1 cm that " appears to be  bilobed containing heterogeneous complex lesions. Although there  appears to be flow within the ovary, ovarian torsion cannot be  excluded by ultrasound.  2. Small amount of free fluid in the pelvis.  3. Unremarkable sonographic appearance of the left ovary.  4. Nabothian cysts in the uterus. No abnormal uterine masses. As per radiology.       Laboratory:  CBC: WBC: 5.2, HGB: 13.3, PLT: 210  CMP: Glucose 102 (H), o/w WNL (Creatinine: 0.64)    UA with micro: negative  HCG: negative     Interventions:  0804 PF 2 mg IV   Toradol 15 mg IV   Zofran 4 mg IV  0808 NS 1L IV    Emergency Department Course:  Nursing notes and vitals reviewed. 0743 I performed an exam of the patient as documented above.     IV inserted. Medicine administered as documented above. Blood drawn. This was sent to the lab for further testing, results above.    The patient was sent for a CT Ab/Pel and US Pelvic while in the emergency department, findings above.     0850 I discussed the case with Dr. Adame of the radiologist service.     0855 I rechecked the patient and discussed the results of her workup thus far.     0952 I discussed the case with Dr. Adame of radiology.      0958 I rechecked the patient and discussed the results of her workup thus far.     1021 I discussed the case with Dr. Arceo of OB-GYN. She will come down and evaluate the patient.     1025 I rechecked the patient and told her Dr. Arceo was coming down to talk with her.     1126  I consulted with Dr. Arceo of OB-GYN. She is in agreement to accept the patient for admission.    Findings and plan explained to the Patient who consents to admission. Discussed the patient with Dr. Arceo, who will admit the patient to the OR  for further evaluation and treatment.    Impression & Plan    Medical Decision Making:  Cora Connell is a 37 year old female who presents to the ED with RLQ abdominal pain. Differential diagnosis is lead by renal colic given her  story and her exam. Also considered appendicitis. CT scan of the abdomen showed a right adnexal mass and pelvis US was recommended. Pelvic US was showing a large, 8.1 cm right ovary. Could be cyst, primary ovarian mass, or intermittent ovarian torsion. The blood flow was seen in the ovary, but torsion could not be ruled out per radiologist. I then  Consulted with OB, who came to the ED and evaluated patient and decided to take patient to the OR for further evaluation and treatment. Patient will then be admitted to the hospital under care of Dr. Arceo.       Diagnosis:    ICD-10-CM   1. RLQ abdominal pain R10.31   2. Ovarian enlargement, right N83.8       Disposition:  Admitted to Dr. Arceo of OB-GYN.        I, Manasa Carroll, am serving as a scribe on 2/9/2018 at 7:43 AM to personally document services performed by Nikita Herring MD based on my observations and the provider's statements to me.       Manasa Carroll  2/9/2018    EMERGENCY DEPARTMENT       Nikita Herring MD  02/09/18 154

## 2018-02-09 NOTE — IP AVS SNAPSHOT
[facility] [address]    [place] [address]    Phone:  [phone]                                       After Visit Summary   2/9/2018    Cora Connell    MRN: [id number]           After Visit Summary Signature Page     I have received my discharge instructions, and my questions have been answered. I have discussed any challenges I see with this plan with the nurse or doctor.    ..........................................................................................................................................  Patient/Patient Representative Signature      ..........................................................................................................................................  Patient Representative Print Name and Relationship to Patient    ..................................................               ................................................  Date                                            Time    ..........................................................................................................................................  Reviewed by Signature/Title    ...................................................              ..............................................  Date                                                            Time

## 2018-02-09 NOTE — ANESTHESIA PREPROCEDURE EVALUATION
Anesthesia Evaluation     . Pt has had prior anesthetic.     No history of anesthetic complications          ROS/MED HX    ENT/Pulmonary:      (-) sleep apnea   Neurologic:      (-) seizures   Cardiovascular:        (-) hypertension   METS/Exercise Tolerance:     Hematologic:         Musculoskeletal:         GI/Hepatic: Comment: nausea       (-) GERD and liver disease   Renal/Genitourinary:      (-) renal disease   Endo:      (-) Type II DM and thyroid disease   Psychiatric:         Infectious Disease:         Malignancy:         Other:                     Physical Exam  Normal systems: cardiovascular, pulmonary and dental    Airway   Mallampati: II  TM distance: >3 FB  Neck ROM: full    Dental     Cardiovascular       Pulmonary                     Anesthesia Plan      History & Physical Review  History and physical reviewed and following examination; no interval change.    ASA Status:  1 emergent.    NPO Status:  > 8 hours    Plan for General, RSI and ETT with Intravenous induction. Maintenance will be Balanced.    PONV prophylaxis:  Ondansetron (or other 5HT-3) and Dexamethasone or Solumedrol       Postoperative Care  Postoperative pain management:  IV analgesics and Multi-modal analgesia.      Consents  Anesthetic plan, risks, benefits and alternatives discussed with:  Patient..        Procedure: Procedure(s):  LAPAROSCOPY DIAGNOSTIC (GYN)  LAPAROSCOPIC CYSTECTOMY OVARIAN (BENIGN)  Preop diagnosis: RIGHT OVARIAN ENLARGEMENT.    Allergies   Allergen Reactions     Penicillins Anaphylaxis     Ancef [Cefazolin Sodium]      No redness or hives or swelling noted with administration     Past Medical History:   Diagnosis Date     Abnormal Pap smear 2000    LEEP     Depressive disorder      Postpartum depression      Varicosities     LE     Past Surgical History:   Procedure Laterality Date     EXCISE LESION UPPER EXTREMITY  1/2/2014    Procedure: EXCISE LESION UPPER EXTREMITY;  EXCISION OF LEFT SHOULDER MASS ;   Surgeon: Nena Zavaleta MD;  Location: [facility] REMOVAL OF TONSILS,<13 Y/O      5 years of age     LEEP TX, CERVICAL       Prior to Admission medications    Medication Sig Start Date End Date Taking? Authorizing Provider   TURMERIC PO Take by mouth 2 times daily    Yes Reported, Patient   calcium carbonate (OS-RACIEL 500 MG [place]. CA) 500 MG tablet Take 500 mg by mouth 2 times daily   Yes Reported, Patient   SERTRALINE HCL 50 mg    Yes Reported, Patient     Current Facility-Administered Medications Ordered in Epic   Medication Dose Route Frequency Last Rate Last Dose     0.9% sodium chloride infusion  1,000 mL Intravenous Continuous   Stopped at 02/09/18 1134     [Auto Hold] morphine (PF) injection 2 mg  2 mg Intravenous Once PRN         lidocaine 1 % 1 mL  1 mL Other Q1H PRN         sodium chloride (PF) 0.9% PF flush 3 mL  3 mL Intracatheter Q8H         lactated ringers infusion   Intravenous Continuous 25 mL/hr at 02/09/18 1206       No current [facility]-ordered outpatient prescriptions on file.     Wt Readings from Last 1 Encounters:   02/09/18 83.5 kg (184 lb)     Temp Readings from Last 1 Encounters:   02/09/18 35.8  C (96.5  F) (Temporal)     BP Readings from Last 6 Encounters:   02/09/18 120/72   04/19/16 113/65   01/02/14 115/73   11/08/13 112/70   04/13/12 114/66   04/02/12 136/79     Pulse Readings from Last 4 Encounters:   02/09/18 76   11/08/13 72   04/13/12 74   04/02/12 85     Resp Readings from Last 1 Encounters:   02/09/18 16     SpO2 Readings from Last 1 Encounters:   02/09/18 96%     Recent Labs   Lab Test  02/09/18   0745   NA  137   POTASSIUM  3.9   CHLORIDE  106   CO2  22   ANIONGAP  9   GLC  102*   BUN  10   CR  0.64   RACIEL  8.7     Recent Labs   Lab Test  02/09/18   0745  04/19/16   1152  04/18/16   0705  04/17/16   2129   WBC  5.2   --    --   7.0   HGB  13.3   --   11.1*  10.8*   PLT  210  190   --   185     No results for input(s): INR in the last 18214 hours.    Invalid input(s): APTT   RECENT  LABS:   ECG:   ECHO:   CXR:                        .

## 2018-02-09 NOTE — ANESTHESIA CARE TRANSFER NOTE
Patient: Cora Connell    Procedure(s):  DIAGNOSTIC LAPAROSCOPY, RIGHT OVARIAN CYSTECTOMY. - Wound Class: I-Clean   - Wound Class: II-Clean Contaminated    Diagnosis: RIGHT OVARIAN ENLARGEMENT.  Diagnosis Additional Information: No value filed.    Anesthesia Type:   General, RSI, ETT     Note:  Airway :Room Air  Patient transferred to:PACU  Comments: Wide awake, comfortable. VSS - report to RNHandoff Report: Identifed the Patient, Identified the Reponsible Provider, Reviewed the pertinent medical history, Discussed the surgical course, Reviewed Intra-OP anesthesia mangement and issues during anesthesia, Set expectations for post-procedure period and Allowed opportunity for questions and acknowledgement of understanding      Vitals: (Last set prior to Anesthesia Care Transfer)    CRNA VITALS  2/9/2018 1338 - 2/9/2018 1414      2/9/2018             Pulse: 112    SpO2: 100 %    Resp Rate (observed): 13    Resp Rate (set): 10                Electronically Signed By: KARMEN Lanza CRNA  February 9, 2018  2:14 PM

## 2018-02-09 NOTE — BRIEF OP NOTE
[facility] Brief Operative Note    Pre-operative diagnosis: RIGHT OVARIAN ENLARGEMENT.   Post-operative diagnosis Right ovarian cysts   Procedure: Procedure(s):  DIAGNOSTIC LAPAROSCOPY, RIGHT OVARIAN CYSTECTOMY. - Wound Class: I-Clean   - Wound Class: II-Clean Contaminated   Surgeon(s): Surgeon(s) and Role:     * Latasha Arceo MD - Primary   Estimated blood loss: 200 mL    Specimens:   ID Type Source Tests Collected by Time Destination   A : right ovarian cyst #1 Tissue Ovary, Right SURGICAL PATHOLOGY EXAM Latasha Arceo MD 2/9/2018  1:18 PM    B : right ovarian cyst #2 Tissue Ovary, Right SURGICAL PATHOLOGY EXAM Latasha Arceo MD 2/9/2018  1:21 PM       Findings: Two right ovarian cysts, one hemorrhagic and one simple.  Normal uterus.   Latasha Arceo MD  Obstetrics, Gynecology, and Infertility  02/09/2018

## 2018-02-09 NOTE — H&P
[facility] History and Physical    Cora Connell MRN# [id number]   Age: 37 year old YOB: 1980     Date of Admission:  2/9/2018    [place] [facility] [place] care provider: Loida Bynum               Chief Complaint:   Pelvic pain          History of Present Illness:   This patient is a 37 year old  with history of sudden onset right sided lower abdominal pain which began suddenly and was intense leading to lightheadedness.  She notes associated nausea overnight.  She presented to the [place] ER where an 8 cm right ovarian mass was noted.  Pain is improved currently in comparison to onset but still notes pain on movement and when using the bathroom.  Ovarian flow was demonstrated on her ultrasound today.      UPT neg and she notes no chance she could be pregnant although not currently on contraception.   No surgical history apart from a skin procedure, no bleeding complications with childbirth           Gynecologic  History:   Three term delivery     Contraception:   None  Sexually transmitted disease history:  none  PAP smear history:  Normal              Past Medical History:     Past Medical History:   Diagnosis Date     Abnormal Pap smear 2000    LEEP     Postpartum depression      Varicosities     LE             Past Surgical History:     Past Surgical History:   Procedure Laterality Date     EXCISE LESION UPPER EXTREMITY  1/2/2014    Procedure: EXCISE LESION UPPER EXTREMITY;  EXCISION OF LEFT SHOULDER MASS ;  Surgeon: Nena Zavaleta MD;  [facility] REMOVAL OF TONSILS,<13 Y/O      5 years of age     LEEP TX, CERVICAL               Social History:     Social History   Substance Use Topics     Smoking status: Never Smoker     Smokeless tobacco: Never Used     Alcohol use Yes      Comment: social             Family History:   The family history is not on file.           Allergies:     Allergies   Allergen Reactions     Penicillins Anaphylaxis     Ancef [Cefazolin  "Sodium]      No redness or hives or swelling noted with administration             Medications:   Sertraline          Review of Systems:   C: NEGATIVE for fever, chills, change in weight  E/M: NEGATIVE for ear, mouth and throat problems  R: NEGATIVE for significant cough or SOB  CV: NEGATIVE for chest pain, palpitations or peripheral edema          Physical Exam:   Blood pressure (!) 122/91, pulse 81, temperature 98.6  F (37  C), temperature source Oral, resp. rate 16, height 1.626 m (5' 4\"), weight 83.5 kg (184 lb), last menstrual period 01/26/2018, SpO2 98 %, not currently breastfeeding.  Constitutional: Healthy appearing female, no acute distress  HEENT: Normal appearance.  Neck supple, thyroid normal in size without nodularity or masses.  Cardiovascular: Regular rate and rhythm without murmurs, clicks, gallops or rub  Respiratory: Clear to auscultation bilaterally without crackles or wheeze  Gastrointestinal: Abdomen soft, non-tender. BS normal. No masses, organomegaly  Pelvic Exam - : External genitalia normal well-estrogenized, healthy tissue.  No obvious excoriations, lesions, or rashes. Bartholins, urethra, skeins normal.  Normal pink vaginal mucosa..   Bimanual: No CMT, anteverted uterus. Right adnexal mass with exquisite tenderness to palpation on the right  Skin: No suspicious lesions or rashes  Psychiatric: mentation appears normal and affect normal/bright           Data:   All laboratory data reviewed  CT scan with right adnexal mass  I personally reviewed the images from the pelvic ultrasound dated 2/9/18 which reveal an 8 x 4.7 x 4.6 cm complex right ovarian mass with a bilobed appearance.  There is flow documented to the ovary.  Etiology consistent with hemorrhagic cyst vs dermoid, no evidence to suggest malignancy.           Assessment and Plan:   Assessment:   37 year old  female with right ovarian mass consistent with intermittent torsion.  Due to the improvement in her symptoms since " admission I discussed with her the option to watch the mass vs diagnostic laparoscopy for diagnosis and treatment purposes and she elected to proceed with laparoscopy.  Discussed the risks, benefits, and alternatives to surgery including bleeding, infection, damage to internal organs, possibility of upstage of malignancy.        Plan:   Diagnostic laparoscopy, right ovarian cystectomy.   Patient is an appropriate candidate for surgery.      Latasha Arceo MD

## 2018-02-09 NOTE — ED NOTES
"[facility]  ED Nurse Handoff Report    ED Chief complaint: Abdominal Pain (all day yesterday had low back pain that wrapped around to the front, thought it was the beginning of her period, persisting today in low abdomen)      ED Diagnosis:   Final diagnoses:   RLQ abdominal pain   Ovarian enlargement, right       Code Status: Full Code    Allergies:   Allergies   Allergen Reactions     Penicillins Anaphylaxis     Ancef [Cefazolin Sodium]      No redness or hives or swelling noted with administration       Activity level - Baseline/Home:  Independent    Activity Level - Current:   Independent     Needed?: No    Isolation: No  Infection: Not Applicable    Bariatric?: No    Vital Signs:   Vitals:    02/09/18 0739 02/09/18 1023   BP: 128/90 (!) 122/91   Pulse:  81   Resp: 16 16   Temp: 98.6  F (37  C)    TempSrc: Oral    SpO2: 98%    Weight: 83.5 kg (184 lb)    Height: 1.626 m (5' 4\")        Cardiac Rhythm: ,        Pain level: 0-10 Pain Scale: 8 (HA)    Is this patient confused?: No    Patient Report: Initial Complaint: Pt C/O low back pain which radiates to right abdomen intermittently since yesterday. C/O nausea starting at 0300  Focused Assessment: Pain as above. Nausea on arrival  Tests Performed: US, CT, Labs  Abnormal Results: Cyst with possible torsion  Treatments provided: IV fluids, labs, toradol for HA    Family Comments:  coming    OBS brochure/video discussed/provided to patient: Yes    ED Medications:   Medications   0.9% sodium chloride BOLUS (0 mLs Intravenous Stopped 2/9/18 0942)     Followed by   0.9% sodium chloride infusion (not administered)   morphine (PF) injection 2 mg (2 mg Intravenous Not Given 2/9/18 0804)   ketorolac (TORADOL) injection 15 mg (15 mg Intravenous Given 2/9/18 1019)   ondansetron (ZOFRAN) injection 4 mg (4 mg Intravenous Given 2/9/18 0804)       Drips infusing?:  No      ED NURSE PHONE NUMBER: [phone]       "

## 2018-02-10 NOTE — ANESTHESIA POSTPROCEDURE EVALUATION
Patient: Cora Connell    Procedure(s):  DIAGNOSTIC LAPAROSCOPY, RIGHT OVARIAN CYSTECTOMY. - Wound Class: I-Clean   - Wound Class: II-Clean Contaminated    Diagnosis:RIGHT OVARIAN ENLARGEMENT.  Diagnosis Additional Information: No value filed.    Anesthesia Type:  General, RSI, ETT    Note:  Anesthesia Post Evaluation    Patient location during evaluation: PACU  Patient participation: Able to fully participate in evaluation  Level of consciousness: sleepy but conscious and responsive to verbal stimuli  Pain management: adequate  Airway patency: patent  Cardiovascular status: acceptable and hemodynamically stable  Respiratory status: acceptable and unassisted  Hydration status: acceptable  PONV: none     Anesthetic complications: None          Last vitals:  Vitals:    02/09/18 1710 02/09/18 1715 02/09/18 1730   BP: 98/62 102/68 104/71   Pulse:      Resp: 10 16    Temp:      SpO2:  97% 94%         Electronically Signed By: Damion Goldberg MD  February 9, 2018  7:26 PM

## 2018-02-10 NOTE — OP NOTE
Procedure Date: 02/09/2018      DATE OF SERVICE: 02/09/2018      PREOPERATIVE DIAGNOSES:  Right ovarian cyst, possible ovarian torsion.      POSTOPERATIVE DIAGNOSES:  Right ovarian cyst x2.      PROCEDURE:  Diagnostic laparoscopy, right ovarian cystectomy.      SURGEON:  Latasha Arceo MD      ANESTHESIA:  General endotracheal.      ESTIMATED BLOOD LOSS:  200 mL.      SPECIMENS:  Right ovarian cyst (1) and right ovarian cyst (2).      COMPLICATIONS:  None.      INDICATIONS FOR THE PROCEDURE:  Cora Connell is a 37-year-old para 3-0-0-3, who presented to the emergency room with sudden onset abdominal pain associated with lightheadedness and nausea.  Abdominal imaging showed a right ovarian mass.  A pelvic ultrasound was performed which revealed an oblong 8 x 4.5 cm right ovarian cyst.  There was some difficulty identifying flow to the right ovary, however it was ultimately documented.  The left ovary was normal in appearance and the uterus was normal in appearance.  Given the above listed findings, intermittent torsion was suspected.  The patient was counseled on options for expectant management versus diagnostic laparoscopy.  After discussion of risks, benefits and alternatives of the procedure, she elected to proceed with diagnostic laparoscopy.      FINDINGS:  On laparoscopy, the uterus was normal in size and contour.  The left ovary and fallopian tube were unremarkable.  The right ovary contained two 4 cm cysts. One appeared hemorrhagic and one appeared simple.      DESCRIPTION OF OPERATIVE PROCEDURE:  The patient was taken to the operating room where general endotracheal anesthesia was initiated without difficulty.  Bladder was drained.  The patient was prepped and draped in the usual sterile fashion and an operative timeout was performed for patient safety.  A Hulka uterine manipulator was placed in the uterus.  The umbilicus was everted and a stab incision was made.  Abdominal entry was carried out under  visual direction utilizing a Visiport trocar.  The abdomen was insufflated with CO2 gas to a pressure of 15 mmHg.  The abdominal survey was performed and a normal liver edge and appendix were noted.  The remainder of the findings were as noted above.  An additional 2 trocars were placed on the left.  The 11 mm trocar was placed under direct visualization through a 1.5 cm incision on the left lower quadrant.  A left paramedian incision was made and a 5 mm trocar was placed under direct visualization.        The right ovary was positioned at the pelvic brim.  Needlepoint cautery was used to make an incision along the ovarian cortex in the border of the cyst.  The ovarian cortex was peeled away from the hemorrhagic cyst and a focal area of rupture was noted.  The cyst was removed from the abdomen and sent for pathologic diagnosis, labeled cyst 1. The second ovarian cyst was grasped and the ovarian cortex peeled away from the cyst.  This simple appearing cyst was removed unruptured.  The EndoCatch bag was introduced into the abdomen and the cyst was placed within the EndoCatch bag.  The entire bag was removed from the 11 mm trocar site intact.  The ovary was then examined and bleeding was noted along the cortical edges.  The bleeding edges were focally cauterized.  The abdomen was irrigated and areas of bleeding were suctioned.  An additional focal cauterization of the cyst bed was undertaken to obtain hemostasis.  The ovary was watched for a period of 2 minutes and adequate hemostasis was assured.  The abdomen was then desufflated and 3 positive pressure breaths were given, the trocars were all removed.  The 11 mm site was closed in an interrupted suture of 0 Vicryl utilizing the Xavier-Stephanie device.  The incisions were closed with 4-0 Monocryl in a subcuticular fashion.  The Hulka uterine manipulator was removed and hemostasis was assured in the cervix.  At the end of the procedure, all sponge, needle and  instrument counts were correct x2.  The patient was awakened and taken to the recovery room in stable condition.         OLGA SANTOS MD             D: 2018   T: 02/10/2018   MT:       Name:     ENRIQUE VERDE   MRN:      [id number]        Account:        [id number]   :      1980           Procedure Date: 2018      Document: [id number]

## 2018-02-12 LAB — COPATH REPORT: NORMAL

## 2019-05-20 ENCOUNTER — HOSPITAL ENCOUNTER (EMERGENCY)
Facility: CLINIC | Age: 39
Discharge: HOME | End: 2019-05-20
Attending: EMERGENCY MEDICINE | Admitting: EMERGENCY MEDICINE
Payer: COMMERCIAL

## 2019-05-20 ENCOUNTER — APPOINTMENT (OUTPATIENT)
Dept: CT IMAGING | Facility: CLINIC | Age: 39
End: 2019-05-20
Attending: EMERGENCY MEDICINE
Payer: COMMERCIAL

## 2019-05-20 VITALS
OXYGEN SATURATION: 99 % | HEART RATE: 74 BPM | RESPIRATION RATE: 16 BRPM | HEIGHT: 64 IN | TEMPERATURE: 98.8 F | WEIGHT: 180.25 LBS | DIASTOLIC BLOOD PRESSURE: 90 MMHG | SYSTOLIC BLOOD PRESSURE: 148 MMHG | BODY MASS INDEX: 30.77 KG/M2

## 2019-05-20 DIAGNOSIS — R10.84 ABDOMINAL PAIN, GENERALIZED: ICD-10-CM

## 2019-05-20 LAB
ALBUMIN SERPL-MCNC: 4 G/DL (ref 3.4–5)
ALBUMIN UR-MCNC: NEGATIVE MG/DL
ALP SERPL-CCNC: 65 U/L (ref 40–150)
ALT SERPL W P-5'-P-CCNC: 30 U/L (ref 0–50)
ANION GAP SERPL CALCULATED.3IONS-SCNC: 4 MMOL/L (ref 3–14)
APPEARANCE UR: CLEAR
AST SERPL W P-5'-P-CCNC: 21 U/L (ref 0–45)
BASOPHILS # BLD AUTO: 0 10E9/L (ref 0–0.2)
BASOPHILS NFR BLD AUTO: 0.2 %
BILIRUB SERPL-MCNC: 0.6 MG/DL (ref 0.2–1.3)
BILIRUB UR QL STRIP: NEGATIVE
BUN SERPL-MCNC: 8 MG/DL (ref 7–30)
CALCIUM SERPL-MCNC: 9 MG/DL (ref 8.5–10.1)
CHLORIDE SERPL-SCNC: 104 MMOL/L (ref 94–109)
CO2 SERPL-SCNC: 29 MMOL/L (ref 20–32)
COLOR UR AUTO: ABNORMAL
CREAT SERPL-MCNC: 0.76 MG/DL (ref 0.52–1.04)
DIFFERENTIAL METHOD BLD: NORMAL
EOSINOPHIL # BLD AUTO: 0.3 10E9/L (ref 0–0.7)
EOSINOPHIL NFR BLD AUTO: 4.5 %
ERYTHROCYTE [DISTWIDTH] IN BLOOD BY AUTOMATED COUNT: 12.9 % (ref 10–15)
GFR SERPL CREATININE-BSD FRML MDRD: >90 ML/MIN/{1.73_M2}
GLUCOSE SERPL-MCNC: 112 MG/DL (ref 70–99)
GLUCOSE UR STRIP-MCNC: NEGATIVE MG/DL
HCG UR QL: NEGATIVE
HCT VFR BLD AUTO: 41.1 % (ref 35–47)
HGB BLD-MCNC: 14.3 G/DL (ref 11.7–15.7)
HGB UR QL STRIP: NEGATIVE
IMM GRANULOCYTES # BLD: 0 10E9/L (ref 0–0.4)
IMM GRANULOCYTES NFR BLD: 0.2 %
KETONES UR STRIP-MCNC: NEGATIVE MG/DL
LEUKOCYTE ESTERASE UR QL STRIP: NEGATIVE
LIPASE SERPL-CCNC: 107 U/L (ref 73–393)
LYMPHOCYTES # BLD AUTO: 1.2 10E9/L (ref 0.8–5.3)
LYMPHOCYTES NFR BLD AUTO: 18.9 %
MCH RBC QN AUTO: 32.5 PG (ref 26.5–33)
MCHC RBC AUTO-ENTMCNC: 34.8 G/DL (ref 31.5–36.5)
MCV RBC AUTO: 93 FL (ref 78–100)
MONOCYTES # BLD AUTO: 0.4 10E9/L (ref 0–1.3)
MONOCYTES NFR BLD AUTO: 6.4 %
NEUTROPHILS # BLD AUTO: 4.4 10E9/L (ref 1.6–8.3)
NEUTROPHILS NFR BLD AUTO: 69.8 %
NITRATE UR QL: NEGATIVE
NRBC # BLD AUTO: 0 10*3/UL
NRBC BLD AUTO-RTO: 0 /100
PH UR STRIP: 7 PH (ref 5–7)
PLATELET # BLD AUTO: 209 10E9/L (ref 150–450)
POTASSIUM SERPL-SCNC: 3.8 MMOL/L (ref 3.4–5.3)
PROT SERPL-MCNC: 7.6 G/DL (ref 6.8–8.8)
RBC # BLD AUTO: 4.4 10E12/L (ref 3.8–5.2)
RBC #/AREA URNS AUTO: 1 /HPF (ref 0–2)
SODIUM SERPL-SCNC: 137 MMOL/L (ref 133–144)
SOURCE: ABNORMAL
SP GR UR STRIP: 1 (ref 1–1.03)
SQUAMOUS #/AREA URNS AUTO: 3 /HPF (ref 0–1)
UROBILINOGEN UR STRIP-MCNC: NORMAL MG/DL (ref 0–2)
WBC # BLD AUTO: 6.2 10E9/L (ref 4–11)
WBC #/AREA URNS AUTO: 1 /HPF (ref 0–5)

## 2019-05-20 PROCEDURE — 81001 URINALYSIS AUTO W/SCOPE: CPT | Performed by: EMERGENCY MEDICINE

## 2019-05-20 PROCEDURE — 85025 COMPLETE CBC W/AUTO DIFF WBC: CPT | Performed by: EMERGENCY MEDICINE

## 2019-05-20 PROCEDURE — 74177 CT ABD & PELVIS W/CONTRAST: CPT

## 2019-05-20 PROCEDURE — 83690 ASSAY OF LIPASE: CPT | Performed by: EMERGENCY MEDICINE

## 2019-05-20 PROCEDURE — 99285 EMERGENCY DEPT VISIT HI MDM: CPT | Mod: 25

## 2019-05-20 PROCEDURE — 25000128 H RX IP 250 OP 636: Performed by: EMERGENCY MEDICINE

## 2019-05-20 PROCEDURE — 96360 HYDRATION IV INFUSION INIT: CPT

## 2019-05-20 PROCEDURE — 81025 URINE PREGNANCY TEST: CPT | Performed by: EMERGENCY MEDICINE

## 2019-05-20 PROCEDURE — 80053 COMPREHEN METABOLIC PANEL: CPT | Performed by: EMERGENCY MEDICINE

## 2019-05-20 PROCEDURE — 25000125 ZZHC RX 250: Performed by: EMERGENCY MEDICINE

## 2019-05-20 PROCEDURE — 96361 HYDRATE IV INFUSION ADD-ON: CPT

## 2019-05-20 PROCEDURE — 25000132 ZZH RX MED GY IP 250 OP 250 PS 637: Performed by: EMERGENCY MEDICINE

## 2019-05-20 RX ORDER — IOPAMIDOL 755 MG/ML
91 INJECTION, SOLUTION INTRAVASCULAR ONCE
Status: COMPLETED | OUTPATIENT
Start: 2019-05-20 | End: 2019-05-20

## 2019-05-20 RX ORDER — ONDANSETRON 4 MG/1
4 TABLET, ORALLY DISINTEGRATING ORAL EVERY 8 HOURS PRN
Qty: 10 TABLET | Refills: 0 | Status: SHIPPED | OUTPATIENT
Start: 2019-05-20 | End: 2019-05-23

## 2019-05-20 RX ADMIN — IOPAMIDOL 91 ML: 755 INJECTION, SOLUTION INTRAVENOUS at 11:24

## 2019-05-20 RX ADMIN — SODIUM CHLORIDE 68 ML: 9 INJECTION, SOLUTION INTRAVENOUS at 11:24

## 2019-05-20 RX ADMIN — SODIUM CHLORIDE 1000 ML: 9 INJECTION, SOLUTION INTRAVENOUS at 10:14

## 2019-05-20 RX ADMIN — LIDOCAINE HYDROCHLORIDE 30 ML: 20 SOLUTION ORAL; TOPICAL at 10:16

## 2019-05-20 ASSESSMENT — ENCOUNTER SYMPTOMS
NAUSEA: 1
VOMITING: 1
DIARRHEA: 0
ABDOMINAL PAIN: 1

## 2019-05-20 ASSESSMENT — MIFFLIN-ST. JEOR: SCORE: 1482.61

## 2019-05-20 NOTE — ED TRIAGE NOTES
"Ate beans last night with some chips, acute onset of \"stomach pain, threw up twice and then had severe itching all over my body worse on bottom of my feet\"  Stomach pain continues today  "

## 2019-05-20 NOTE — ED PROVIDER NOTES
"  History     Chief Complaint:  Abdominal Pain      HPI   Cora Connell is a 38 year old female who presents to the emergency department for evaluation of abdominal pain. Patient states that she ate beans and within an hour, she experienced onset of significant abdominal pain alongside dizziness and nausea. She vomited five times. After vomiting, she laid on the bed with a heating pad over her abdomen. This did help. However, the pain continued--her pain is described as \"burning\" and \"stabbing.\" There is some pain radiation to her back, too. In addition, last night, she experienced onset of abnormal itching over her bilateral lower legs and her bilateral arms. Denies diarrhea. This morning, she experienced onset of acute abdominal pain again, and decided to come into the emergency department for evaluation instead of going to work.     Allergies:  Penicillins  Ancef [Cefazolin Sodium]      Medications:    Sertraline     Past Medical History:    Abnormal pap smear  Depressive disorder  Varicosities     Past Surgical History:    Excise lesion upper extremity  Tonsillectomy  Cystectomy ovarian benign   Diagnostic laparoscopy   LEEP     Family History:    History reviewed. No pertinent family history.      Social History:  The patient was alone.  Smoking Status: Never  Smokeless Tobacco: Never  Alcohol Use: yes   Marital Status:        Review of Systems   Gastrointestinal: Positive for abdominal pain, nausea and vomiting. Negative for diarrhea.   All other systems reviewed and are negative.      Physical Exam     Patient Vitals for the past 24 hrs:   BP Temp Temp src Pulse Heart Rate Resp SpO2 Height Weight   05/20/19 1218 148/90 -- -- 74 74 16 99 % -- --   05/20/19 0936 (!) 154/110 98.8  F (37.1  C) Oral -- 75 20 100 % 1.626 m (5' 4\") 81.8 kg (180 lb 4 oz)      Physical Exam  Vitals: reviewed by me  General: Pt seen on [facility], pleasant, cooperative, and alert to conversation  Eyes: Tracking well, " clear conjunctiva BL  ENT: MMM, midline trachea.   Lungs: No tachypnea, no accessory muscle use. No respiratory distress.   CV: Rate as above, regular rhythm.    Abd: Soft, minimal tenderness to palpation to central abdomen and epigastrium, but no guarding, no rebound.  MSK: no peripheral edema or joint effusion.  No evidence of trauma  Skin: No rash, normal turgor and temperature  Neuro: Clear speech and no facial droop.  Psych: Not RIS, no e/o AH/VH    Emergency Department Course     Imaging:  Radiology findings were communicated with the patient who voiced understanding of the findings.    CT Abdomen Pelvis w Contrast  IMPRESSION:   1. Small amount of free fluid in the pelvis is nonspecific, and could  be physiologic.  2. Scattered colonic diverticulosis, without convincing evidence for  diverticulitis.  3. No definite cause for abdominal distention and nausea is  identified. No bowel obstruction.  Report per radiology     Laboratory:  Laboratory findings were communicated with the patient who voiced understanding of the findings.    CBC: AWNL. (WBC 6.2, HGB 14.3, )   CMP: Glucose 112 (H) o/w WNL (Creatinine 0.76)    Lipase: 107  UA: Light yellow, clear urine. Specific gravity urine 1.002 (L), Squamous Epithelial/HPF Urine 3 (H) o/w WNL o/w WNL    HCG Qualitative Urine: negative     Interventions:  1014 - NS Bolus 1,000mL IV   1016 - GI Cocktail 30mg PO     Emergency Department Course:  Nursing notes and vitals reviewed.  The patient was sent for a CT Abdomen Pelvis w contrast while in the emergency department, results above.   IV was inserted and blood was drawn for laboratory testing, results above.  The patient provided a urine sample here in the emergency department. This was sent for laboratory testing, findings above.    1000: I performed an exam of the patient as documented above.     1103: Patient rechecked and updated.      1155: Patient rechecked and updated.      Findings and plan explained to  the Patient. Patient discharged home with instructions regarding supportive care, medications, and reasons to return. The importance of close follow-up was reviewed. The patient was prescribed Zofran.    I personally reviewed the laboratory and imaging results with the Patient and answered all related questions prior to discharge.    Impression & Plan      Medical Decision Making:  Cora Connell is a 38 year old female who presents to the emergency department today with abdominal pain, unclear cause. Differential diagnosis included gastritis vs food poisoning vs gastroenteritis vs other. Fortunately, she has a negative CT scan of the abdomen which was done as her pain had not been relieved with GI cocktail. Fortunately, she also has normal vital signs, normal lab work up and has tolerated orals here. Will provide symptomatic relief with Zofran, may have her avoid beans in the future as there appears to also have possibly been an allergic component to this as she had some itchiness, but this is all relieved. Will provide supportive therapy and discharge as above.     Diagnosis:    ICD-10-CM   1. Abdominal pain, generalized R10.84       Disposition:  Discharged to home.     Discharge Medications:  ondansetron 4 MG ODT tab  Commonly known as:  ZOFRAN ODT  4 mg, Oral, EVERY 8 HOURS PRN       Scribe Disclosure:  Padmini TRAN, am serving as a scribe at 10:05 AM on 5/20/2019 to document services personally performed by Jose Padilla MD based on my observations and the provider's statements to me.    5/20/2019    EMERGENCY DEPARTMENT       Jose Padilla MD  05/20/19 4902

## 2019-06-25 ENCOUNTER — APPOINTMENT (OUTPATIENT)
Dept: GENERAL RADIOLOGY | Facility: CLINIC | Age: 39
End: 2019-06-25
Attending: EMERGENCY MEDICINE
Payer: COMMERCIAL

## 2019-06-25 ENCOUNTER — HOSPITAL ENCOUNTER (EMERGENCY)
Facility: CLINIC | Age: 39
Discharge: HOME | End: 2019-06-25
Attending: EMERGENCY MEDICINE | Admitting: EMERGENCY MEDICINE
Payer: COMMERCIAL

## 2019-06-25 VITALS
TEMPERATURE: 98.5 F | HEIGHT: 65 IN | OXYGEN SATURATION: 99 % | BODY MASS INDEX: 29.99 KG/M2 | SYSTOLIC BLOOD PRESSURE: 140 MMHG | WEIGHT: 180 LBS | DIASTOLIC BLOOD PRESSURE: 84 MMHG | HEART RATE: 70 BPM | RESPIRATION RATE: 20 BRPM

## 2019-06-25 DIAGNOSIS — R07.9 CHEST PAIN, UNSPECIFIED TYPE: ICD-10-CM

## 2019-06-25 LAB
ALBUMIN SERPL-MCNC: 3.8 G/DL (ref 3.4–5)
ALP SERPL-CCNC: 64 U/L (ref 40–150)
ALT SERPL W P-5'-P-CCNC: 22 U/L (ref 0–50)
ANION GAP SERPL CALCULATED.3IONS-SCNC: 3 MMOL/L (ref 3–14)
AST SERPL W P-5'-P-CCNC: 14 U/L (ref 0–45)
BASOPHILS # BLD AUTO: 0 10E9/L (ref 0–0.2)
BASOPHILS NFR BLD AUTO: 0.2 %
BILIRUB SERPL-MCNC: 0.5 MG/DL (ref 0.2–1.3)
BUN SERPL-MCNC: 10 MG/DL (ref 7–30)
CALCIUM SERPL-MCNC: 8.7 MG/DL (ref 8.5–10.1)
CHLORIDE SERPL-SCNC: 108 MMOL/L (ref 94–109)
CO2 SERPL-SCNC: 29 MMOL/L (ref 20–32)
CREAT SERPL-MCNC: 0.71 MG/DL (ref 0.52–1.04)
D DIMER PPP FEU-MCNC: 0.4 UG/ML FEU (ref 0–0.5)
DIFFERENTIAL METHOD BLD: NORMAL
EOSINOPHIL # BLD AUTO: 0.1 10E9/L (ref 0–0.7)
EOSINOPHIL NFR BLD AUTO: 3.4 %
ERYTHROCYTE [DISTWIDTH] IN BLOOD BY AUTOMATED COUNT: 13.4 % (ref 10–15)
GFR SERPL CREATININE-BSD FRML MDRD: >90 ML/MIN/{1.73_M2}
GLUCOSE SERPL-MCNC: 99 MG/DL (ref 70–99)
HCT VFR BLD AUTO: 39.6 % (ref 35–47)
HGB BLD-MCNC: 13.9 G/DL (ref 11.7–15.7)
IMM GRANULOCYTES # BLD: 0 10E9/L (ref 0–0.4)
IMM GRANULOCYTES NFR BLD: 0.2 %
INTERPRETATION ECG - MUSE: NORMAL
LIPASE SERPL-CCNC: 124 U/L (ref 73–393)
LYMPHOCYTES # BLD AUTO: 1 10E9/L (ref 0.8–5.3)
LYMPHOCYTES NFR BLD AUTO: 23.8 %
MCH RBC QN AUTO: 32.6 PG (ref 26.5–33)
MCHC RBC AUTO-ENTMCNC: 35.1 G/DL (ref 31.5–36.5)
MCV RBC AUTO: 93 FL (ref 78–100)
MONOCYTES # BLD AUTO: 0.4 10E9/L (ref 0–1.3)
MONOCYTES NFR BLD AUTO: 10 %
NEUTROPHILS # BLD AUTO: 2.5 10E9/L (ref 1.6–8.3)
NEUTROPHILS NFR BLD AUTO: 62.4 %
NRBC # BLD AUTO: 0 10*3/UL
NRBC BLD AUTO-RTO: 0 /100
PLATELET # BLD AUTO: 201 10E9/L (ref 150–450)
POTASSIUM SERPL-SCNC: 4.1 MMOL/L (ref 3.4–5.3)
PROT SERPL-MCNC: 7.2 G/DL (ref 6.8–8.8)
RBC # BLD AUTO: 4.26 10E12/L (ref 3.8–5.2)
SODIUM SERPL-SCNC: 140 MMOL/L (ref 133–144)
TROPONIN I SERPL-MCNC: <0.015 UG/L (ref 0–0.04)
WBC # BLD AUTO: 4.1 10E9/L (ref 4–11)

## 2019-06-25 PROCEDURE — 85379 FIBRIN DEGRADATION QUANT: CPT | Performed by: EMERGENCY MEDICINE

## 2019-06-25 PROCEDURE — 99285 EMERGENCY DEPT VISIT HI MDM: CPT | Mod: 25

## 2019-06-25 PROCEDURE — 96360 HYDRATION IV INFUSION INIT: CPT

## 2019-06-25 PROCEDURE — 85025 COMPLETE CBC W/AUTO DIFF WBC: CPT | Performed by: EMERGENCY MEDICINE

## 2019-06-25 PROCEDURE — 80053 COMPREHEN METABOLIC PANEL: CPT | Performed by: EMERGENCY MEDICINE

## 2019-06-25 PROCEDURE — 93005 ELECTROCARDIOGRAM TRACING: CPT

## 2019-06-25 PROCEDURE — 96361 HYDRATE IV INFUSION ADD-ON: CPT

## 2019-06-25 PROCEDURE — 25000128 H RX IP 250 OP 636: Performed by: EMERGENCY MEDICINE

## 2019-06-25 PROCEDURE — 84484 ASSAY OF TROPONIN QUANT: CPT | Performed by: EMERGENCY MEDICINE

## 2019-06-25 PROCEDURE — 83690 ASSAY OF LIPASE: CPT | Performed by: EMERGENCY MEDICINE

## 2019-06-25 PROCEDURE — 71046 X-RAY EXAM CHEST 2 VIEWS: CPT

## 2019-06-25 RX ORDER — SODIUM CHLORIDE 9 MG/ML
1000 INJECTION, SOLUTION INTRAVENOUS CONTINUOUS
Status: DISCONTINUED | OUTPATIENT
Start: 2019-06-25 | End: 2019-06-25 | Stop reason: HOSPADM

## 2019-06-25 RX ADMIN — SODIUM CHLORIDE 1000 ML: 9 INJECTION, SOLUTION INTRAVENOUS at 10:34

## 2019-06-25 ASSESSMENT — ENCOUNTER SYMPTOMS
TROUBLE SWALLOWING: 1
LIGHT-HEADEDNESS: 1
DIZZINESS: 1
CHEST TIGHTNESS: 1
ABDOMINAL PAIN: 1
SHORTNESS OF BREATH: 1

## 2019-06-25 ASSESSMENT — MIFFLIN-ST. JEOR: SCORE: 1497.35

## 2019-06-25 NOTE — ED AVS SNAPSHOT
Emergency Department  [address] [place] [address]  Phone:  [phone]  Fax:  [phone]                                    Cora Connell   MRN: [id number]    Department:   Emergency Department   Date of Visit:  6/25/2019           After Visit Summary Signature Page    I have received my discharge instructions, and my questions have been answered. I have discussed any challenges I see with this plan with the nurse or doctor.    ..........................................................................................................................................  Patient/Patient Representative Signature      ..........................................................................................................................................  Patient Representative Print Name and Relationship to Patient    ..................................................               ................................................  Date                                   Time    ..........................................................................................................................................  Reviewed by Signature/Title    ...................................................              ..............................................  Date                                               Time          22EPIC Rev 08/18

## 2019-06-25 NOTE — ED PROVIDER NOTES
"  History     Chief Complaint:  Chest Pain    The history is provided by the patient.      Cora Connell is a 38 year old female who presents with chest pain. The patient states that she had severe chest pain last night. She's had anxiety attacks and heart burn before and says the pain was different. The patient described the pain as being in her upper mid chest that radiated to the back and she felt like she couldn't swallow. The patient stated that the chest pain lasted about three hours and laying down did not exacerbate her pain. The patient states she also has had some \"jolts\" on the right side of her neck that is almost like a spasm.The patient reports that she has had some shortness of breath recently but it has never stopped her from being able to run around with her kids. The patient recently went on a two week trip to [place] and they drove there.     The patient notes that she first noticed chest pain a month ago on 5/20/19. At that time, the patient said she felt dizzy and fell to the floor and the next day she had stomach pain, was sweaty, nauseous, and continued to feel dizzy. She continues to have some dizziness and some light headedness.    CARDIAC RISK FACTORS:  Sex:    Female  Tobacco:   No  Hypertension:   No  Hyperlipidemia:  No  Diabetes:   No  Family History:  No    PE/DVT RISK FACTORS:  Sex:    Female  Hormones:   No  Tobacco:   No  Cancer:   No  Travel:   Drove to [place]  Surgery:   No  Other immobilization: No  Personal history:  No  Family history:  Yes, father     Allergies:  Penicillins  Ancef     Medications:    Calcium carbonate  Norco  Sertraline  Turmeric     Past Medical History:    Abnormal pap smear  Depressive disorder  Postpartum depression  Varicosities    Past Surgical History:    Excise lesion upper extremity left shoulder mass  Removal of tonsils  Laparoscopic cystectomy, ovarian  [place], cervical     Family History:    History reviewed. No pertinent family " "history.    Social History:  Smoking status: No  Alcohol use: Yes, socially  Drug use: No  PCP: Loida Bynum  Presents to the ED alone  Marital Status:       Review of Systems   HENT: Positive for trouble swallowing.    Respiratory: Positive for chest tightness and shortness of breath.    Gastrointestinal: Positive for abdominal pain.   Neurological: Positive for dizziness and light-headedness.   All other systems reviewed and are negative.      Physical Exam     Patient Vitals for the past 24 hrs:   BP Temp Temp src Pulse Heart Rate Resp SpO2 Height Weight   06/25/19 1100 -- -- -- -- 68 16 100 % -- --   06/25/19 1045 -- -- -- -- 60 19 99 % -- --   06/25/19 1030 -- -- -- -- 76 9 99 % -- --   06/25/19 1015 -- -- -- -- 80 17 99 % -- --   06/25/19 0958 (!) 163/109 98.5  F (36.9  C) Oral 80 -- 16 98 % 1.651 m (5' 5\") 81.6 kg (180 lb)       Physical Exam  Eye:  Pupils are equal, round, and reactive.  Extraocular movements intact.    ENT:  No rhinorrhea.  Moist mucus membranes.  Normal tongue and tonsil.    Cardiac:  Regular rate and rhythm.  No murmurs, gallops, or rubs.    Pulmonary:  Clear to auscultation bilaterally.  No wheezes, rales, or rhonchi.    Abdomen:  Positive bowel sounds.  Abdomen is soft and non-distended, without focal tenderness.    Musculoskeletal:  No lower extremity edema or asymmetry. palpation of chest wall does not exacerbate her pain.     Skin:  Warm and dry without rashes.    Neurologic:  Non-focal exam without asymmetric weakness or numbness.     Psychiatric:  Normal affect with appropriate interaction with examiner.      Emergency Department Course   ECG (04:37:68):  Rate 68 bpm. ME interval 122. QRS duration 82. QT/QTc 392/416. P-R-T axes 42 53 39. Normal sinus rhythm. Normal ECG. Agree with computer interpretation. Interpreted at 1007 by Trierweiler, Chad A, MD.    Imaging:  Radiographic findings were communicated with the patient who voiced understanding of the findings.    XR " Chest, PA & LAT G/E  Views:  No acute pulmonary disease.  As read by radiology.     Laboratory:  CBC: WBC 4.1, HGB 13.9,   CMP: o/w WNL (Creatinine: 0.71)  Troponin (1035): <0.015  D dimer: 0.4  Lipase: 124    Interventions:  1034: NS, 1 L, IV    Emergency Department Course:  1013: Nursing notes and vitals reviewed.  I performed an exam of the patient as documented above.     IV inserted. Medicine administered as documented above. Blood drawn. This was sent to the lab for further testing, results above.     An EKG was performed, findings above    The patient was sent for a chest x-ray while in the emergency department, findings above.     1153: I rechecked the patient and discussed the results of her workup thus far.     Findings and plan explained to the Patient. Patient discharged home with instructions regarding supportive care, medications, and reasons to return. The importance of close follow-up was reviewed.     I personally reviewed the laboratory results with the Patient and answered all related questions prior to discharge.     Impression & Plan    Medical Decision Making:  This is a fairly healthy 38-year-old woman returns to the emergency department for evaluation of chest pain that she experienced last night followed by some discomfort here today.  She notes that the pain began when she was simply at rest last night.  It was fairly severe and has mostly dissipated throughout the night.  She denied any other associated symptoms with it.  However, she is concerned that this could represent a cardiac etiology.  She does note a recent travel history by driving to and from [place] and there is also a family history of blood clots.    On exam, the patient appears clinically very well.  A head to toe exam is completely unremarkable.  Her vital signs are reassuring.  There is no significant edema or lower extremity asymmetry.  A laboratory investigation was pursued which shows a normal hemoglobin,  normal chemistries, normal liver function test and lipase.  Her troponin is undetectable despite symptoms occurring greater than 12 hours ago.  Her d-dimer is negative.  Considering her HEART score of 0, I feel that she is safe to be discharged home from a cardiac standpoint to pursue further outpatient evaluation if she has further spells of chest pain.  Considering her young age and lack of ongoing symptoms, I feel this is highly unlikely to represent aortic dissection.  She has been ruled out for PE is a low risk patient with a negative d-dimer.  I do not believe this represents an upper abdominal source with her normal labs and lack of associated with food.    We explained that this most likely represents some degree of esophageal spasm, musculoskeletal etiology, or even pleurisy.  Nonetheless, she will follow-up with her primary doctor if symptoms return with immediate return to us for any worsening of condition or other emergent concerns.    Diagnosis:    ICD-10-CM    1. Chest pain, unspecified type R07.9        Disposition:  discharged to home  Scribe Disclosure  I, Gabi Otero, am serving as a scribe at 10:13 AM on 6/25/2019 to document services personally performed by Trierweiler, Chad A, MD based on my observations and the provider's statements to me.     Gabi Otero  6/25/2019    EMERGENCY DEPARTMENT       Trierweiler, Chad A, MD  06/27/19 6366

## 2021-04-09 ENCOUNTER — APPOINTMENT (OUTPATIENT)
Dept: GENERAL RADIOLOGY | Facility: CLINIC | Age: 41
End: 2021-04-09
Attending: EMERGENCY MEDICINE
Payer: COMMERCIAL

## 2021-04-09 ENCOUNTER — HOSPITAL ENCOUNTER (EMERGENCY)
Facility: CLINIC | Age: 41
Discharge: HOME | End: 2021-04-09
Attending: EMERGENCY MEDICINE | Admitting: EMERGENCY MEDICINE
Payer: COMMERCIAL

## 2021-04-09 VITALS
RESPIRATION RATE: 16 BRPM | TEMPERATURE: 96.4 F | HEIGHT: 64 IN | SYSTOLIC BLOOD PRESSURE: 122 MMHG | HEART RATE: 94 BPM | BODY MASS INDEX: 30.73 KG/M2 | DIASTOLIC BLOOD PRESSURE: 87 MMHG | WEIGHT: 180 LBS | OXYGEN SATURATION: 94 %

## 2021-04-09 DIAGNOSIS — U07.1 INFECTION DUE TO 2019 NOVEL CORONAVIRUS: ICD-10-CM

## 2021-04-09 PROCEDURE — 71045 X-RAY EXAM CHEST 1 VIEW: CPT

## 2021-04-09 PROCEDURE — 99283 EMERGENCY DEPT VISIT LOW MDM: CPT | Mod: 25

## 2021-04-09 RX ORDER — PREDNISONE 20 MG/1
40 TABLET ORAL DAILY
Qty: 10 TABLET | Refills: 0 | Status: SHIPPED | OUTPATIENT
Start: 2021-04-09 | End: 2021-04-14

## 2021-04-09 RX ORDER — AZITHROMYCIN 250 MG/1
TABLET, FILM COATED ORAL
Qty: 6 TABLET | Refills: 0 | Status: SHIPPED | OUTPATIENT
Start: 2021-04-09 | End: 2023-11-07

## 2021-04-09 RX ORDER — CODEINE PHOSPHATE AND GUAIFENESIN 10; 100 MG/5ML; MG/5ML
1-2 SOLUTION ORAL EVERY 4 HOURS PRN
Qty: 180 ML | Refills: 0 | Status: SHIPPED | OUTPATIENT
Start: 2021-04-09 | End: 2023-11-07

## 2021-04-09 RX ORDER — ALBUTEROL SULFATE 90 UG/1
2 AEROSOL, METERED RESPIRATORY (INHALATION) EVERY 4 HOURS PRN
Qty: 8 G | Refills: 0 | Status: SHIPPED | OUTPATIENT
Start: 2021-04-09 | End: 2024-08-28

## 2021-04-09 ASSESSMENT — MIFFLIN-ST. JEOR: SCORE: 1471.47

## 2021-04-09 NOTE — ED TRIAGE NOTES
Covid+ 1 week ago. Gets pneumonia every year. Was feeling better but now having a hard time breathing and coughing a lot.

## 2021-04-09 NOTE — ED PROVIDER NOTES
History      Chief Complaint:     Covid Concern        HPI   Cora oCnnell is a 40 year old female who presents one week after a positive COVID test within increasing shortness of breath and cough.  Initially had fevers and headaches which have improved.  Gets dizzy with exertional shortness of breath that is persistent/worsening.  History of pneumonia and this feels similar.  Has needed albuterol w/pneumonia before.  Chest pain with coughing.  Has tried Tylenol.  No nausea, vomiting, diarrhea, abdominal pain.       Review of Systems  Ten system ROS reviewed and is negative except as above        Allergies:        Penicillins  Ancef [Cefazolin Sodium]        Medications:    Antidepressant - no longer Sertraline but cannot remember which one        Past Medical History:               Past Medical History:   Diagnosis Date     Abnormal Pap smear      Depressive disorder       Postpartum depression       Varicosities              Patient Active Problem List     Diagnosis Date Noted      (normal spontaneous vaginal delivery) 2016       Priority: Medium     Indication for care in labor or delivery 2016       Priority: Medium         Past Surgical History:          Past Surgical History         Past Surgical History:   Procedure Laterality Date     EXCISE LESION UPPER EXTREMITY   2014     Procedure: EXCISE LESION UPPER EXTREMITY;  EXCISION OF LEFT SHOULDER MASS ;  Surgeon: Nena Zavaleta MD;  Location: [facility] REMOVAL OF TONSILS,<11 Y/O         5 years of age     LAPAROSCOPIC CYSTECTOMY OVARIAN (BENIGN) Right 2018     Procedure: LAPAROSCOPIC CYSTECTOMY OVARIAN (BENIGN);;  Surgeon: Latasha Arceo MD;  Location:  OR     LAPAROSCOPY DIAGNOSTIC (GYN) Right 2018     Procedure: LAPAROSCOPY DIAGNOSTIC (GYN);  DIAGNOSTIC LAPAROSCOPY, RIGHT OVARIAN CYSTECTOMY.;  Surgeon: Latasha Arceo MD;  Location:  OR     LEEP TX, CERVICAL             Social History:  Presents  "alone     Physical Exam      Patient Vitals for the past 24 hrs:    BP Temp Temp src Pulse Resp SpO2 Height Weight   04/09/21 1406 122/87 96.4  F (35.8  C) Temporal 94 16 94 % 1.626 m (5' 4\") 81.6 kg (180 lb)         Physical Exam  Resp:               Non-labored; L lung base crackles;  Coughing w/deep breathing, cough has wheezing quality  CV:                  RRR  Neuro:             Alert and cooperative  GI:                   No abdominal tenderness  MSkel:             Moving all extremities  Skin:                No rash        Emergency Department Course      Imaging:  XR Chest Port 1 View   Preliminary Result   IMPRESSION: New peripheral infiltrate on the left compatible with   history. Right lung grossly clear.             Impression & Plan       Medical Decision Making:  Cora Connell presents with worsening respiratory symptoms due to COVID.  Saturations 94%, HR normal, no signs of DVT and PE will not be pursued.  No SIRS criteria in vitals.  Trajectory and past history both concerning for pneumonia.  CXR obtained and reviewed by myself.  No large infiltrate or PTX or effusion noted.  Discussed patient's history and clinical concern for pneumonia.  Imaging findings can lag clinical picture.  Will prescribe antibiotics.  Due to symptoms and history of bronchitis will also prescribe steroids and albuterol.  Cough suppressant.  Patient interested in discharge prior to final x-ray read knowing that she is already going to be on antibiotics.  Instructed to check results in my chart.  Get well loop referral and pulse oximeter for home.       Final XR read noted.  Continue existing plan and follow-up instructions.  Is on treatment for pneumonia.     Covid-19  Cora Connell was evaluated during a global COVID-19 pandemic, which necessitated consideration that the patient might be at risk for infection with the SARS-CoV-2 virus that causes COVID-19.   Applicable protocols for evaluation were followed " during the patient's care.   COVID-19 was considered as part of the patient's evaluation. The plan for testing is:  a test was obtained at a previous visit and reviewed & considered today.     Diagnosis:      ICD-10-CM     1. Infection due to 2019 novel coronavirus  U07.1 COVID-19 [facility] Referral       Care Coordination Referral         Discharge Medications:       New Prescriptions     ALBUTEROL (PROAIR HFA) 108 (90 BASE) MCG/ACT INHALER    Inhale 2 puffs into the lungs every 4 hours as needed (short of breath, wheeze, cough)     AZITHROMYCIN (ZITHROMAX) 250 MG TABLET    500mg (2 tabs) on Day 1 then 250mg (1 tab) on Days 2-5     GUAIFENESIN-CODEINE (ROBITUSSIN AC) 100-10 MG/5ML SOLUTION    Take 5-10 mLs by mouth every 4 hours as needed for cough     PREDNISONE (DELTASONE) 20 MG TABLET    Take 2 tablets (40 mg) by mouth daily for 5 days            Scribe Disclosure:  Brynn TRAN MD, MD, am serving as a scribe at 4:28 PM on 4/9/2021 to document services personally performed by Brynn Bennett, * based on my observations and the provider's statements to me.        Brynn Bennett MD  04/09/21 1747

## 2021-04-09 NOTE — DISCHARGE INSTRUCTIONS
Discharge Instructions  COVID-19    COVID-19 is the disease caused by a new coronavirus. The virus spreads from person-to-person primarily by droplets when an infected person coughs or sneezes and the droplet either lands on another person or that other person touches a surface with the droplet on it. There are tests available to diagnose COVID-19. There is no specific treatment or medicine for the disease.    You may have been diagnosed with COVID, may be being tested for COVID and have a pending test result, or may have been exposed to COVID.    Symptoms of COVID-19    Many people have no symptoms or mild symptoms.  Symptoms may usually appear 4 to 5 days (up to 14 days) after contact with a person with COVID-19. Some people will get severe symptoms and pneumonia. Usual symptoms are:     ? Fever  ? Cough  ? Trouble breathing    Less common symptoms are: Headache, body aches, sore throat, sneezing, diarrhea, loss of taste or smell.    Isolation and Quarantine    You were seen because you have symptoms, had an exposure, or had some other concern about possible COVID. The best way to stop the spread of the virus is to avoid contact with others.  Isolation refers to sick people staying away from people who are not sick. A person in quarantine is limiting activity because they were exposed and are waiting to see if they might become sick.    If you test positive for COVID, you should stay home (isolation) for at least 10 days after your symptoms began, and for 24 hours with no fever and improvement of symptoms--whichever is longer. (Your fever should be gone for 24 hours without using fever-reducing medicine). If you have no symptoms, you should stay home (isolation) for 10 days from the day of the test.    For example, if you have a fever and cough for 6 days, you need to stay home 4 more days with no fever for a total of 10 days. Or, if you have a fever and cough for 10 days, you need to stay home one more day with  no fever for a total of 11 days.    If you have a high-risk exposure to COVID (you spent 15 minutes or more within six feet of somebody who has COVID), you should stay home (quarantine) for 14 days. Even if you test negative for COVID, the CDC recommends a 14-day quarantine from the time of your last exposure to that individual. There are options for a shortened (<14 day quarantine) you can review at:    https://www.health.[facility].[place]./diseases/coronavirus/close.html#long    If you have symptoms but a negative test, you should stay at home until you are symptom-free and without fever for 24 hours, using the same judgment you would for when it is safe to return to work/school from strep throat, influenza, or the common cold. If you worsen, you should consider being re-evaluated.    If you are being tested for COVID and your test is pending, you should stay home until you know your test result.    How should I protect myself and others?    Do not go to work or school. Have a friend or relative do your shopping. Do not use public transportation (bus, train) or ridesharing (Lyft, Uber).    Separate yourself from other people in your home. As much as possible, you should stay in one room and away from other people in your home. Also, use a separate bathroom, if possible. Avoid handling pets or other animals while sick.     Wear a facemask if you need to be around other people and cover your mouth and nose with a tissue when you cough or sneeze.     Avoid sharing personal household items. You should not share dishes, drinking glasses, forks/knives/spoons, towels, or bedding with other people in your home. After using these items, they should be washed with soap and water. Clean parts of your home that are touched often (doorknobs, faucets, countertops, etc.) daily.     Wash your hands often with soap and water for at least 20 seconds or use an alcohol-based hand  containing at least 60% alcohol.     Avoid touching  your face.    Treat your symptoms. You can take Acetaminophen (Tylenol) to treat body aches and fever as needed for comfort. Ibuprofen (Advil or Motrin) can be used as well if you still have symptoms after taking Tylenol. Drink fluids. Rest.    Watch for worsening symptoms such as shortness of breath/difficulty breathing or very severe weakness.    Employers/workplaces are being asked by the Centers for Disease Control (CDC) to not request notes/documentation for you to return to work or prove that you were ill. You may choose to show your employer this paperwork. Also, repeat testing should not be required to return to work.    Exercise/Sports in rare cases, COVID could affect your heart in a way that makes exercise or participation in sports dangerous.  If you have a mild COVID illness (fever, cough, sore throat, and similar symptoms but no difficulty breathing or abnormalities of the lung): After your COVID symptoms have resolved, wait 14-days before returning to activity.  If you have more than a mild illness (meaning that you have problems with your breathing or lungs) or if you participate in competitive or strenuous activity or have a history of heart disease: Please see your primary doctor/provider prior to return to activity/competition.    Return to the Emergency Department if:    If you are developing worsening breathing, shortness of breath, or feel worse you should seek medical attention.  If you are uncertain, contact your health care provider/clinic. If you need emergency medical attention, call 911 and tell them you have been ill.

## 2021-04-10 ENCOUNTER — PATIENT OUTREACH (OUTPATIENT)
Dept: CARE COORDINATION | Facility: CLINIC | Age: 41
End: 2021-04-10

## 2021-04-10 NOTE — PROGRESS NOTES
Care Coordination ED Discharge Follow up Note  Pt on home virtual monitoring program for COVID-19, call made to do assessment, verify follow-up appt and offer care coordination, no answer. Left message asking pt to make appt for virtual visit in the next one to two days and to accept the invitation to participate in [facility] [facility]. RN will call back tomorrow.

## 2021-04-12 ENCOUNTER — PATIENT OUTREACH (OUTPATIENT)
Dept: CARE COORDINATION | Facility: CLINIC | Age: 41
End: 2021-04-12

## 2021-04-12 NOTE — PROGRESS NOTES
Care Coordination Hospital/ED Discharge Follow up Note    Patient referred for Home Virtual COVID-19 Monitoring Program.  Second call made to complete assessment, verify or assist with scheduling follow up appt, and ensure patient is engaged with [employer], no answer.  Left message reminding pt to schedule a virtual visit with their PCP in the next one to two days, provided pt with [facility]'s 24/7 nurse triage/central scheduling number ([phone]), and continue participating in [employer].  No further outreaches will be made.       Gabi Cha RN, [facility]

## 2022-03-23 PROCEDURE — 88305 TISSUE EXAM BY PATHOLOGIST: CPT | Mod: 26 | Performed by: PATHOLOGY

## 2022-03-23 PROCEDURE — 88304 TISSUE EXAM BY PATHOLOGIST: CPT | Mod: 26 | Performed by: PATHOLOGY

## 2022-03-23 PROCEDURE — 88305 TISSUE EXAM BY PATHOLOGIST: CPT | Mod: TC,ORL

## 2022-03-23 PROCEDURE — 88305 TISSUE EXAM BY PATHOLOGIST: CPT | Mod: TC,ORL | Performed by: OBSTETRICS & GYNECOLOGY

## 2022-03-23 PROCEDURE — 88302 TISSUE EXAM BY PATHOLOGIST: CPT | Mod: 26 | Performed by: PATHOLOGY

## 2022-03-24 ENCOUNTER — LAB REQUISITION (OUTPATIENT)
Dept: LAB | Facility: CLINIC | Age: 42
End: 2022-03-24
Payer: COMMERCIAL

## 2022-03-25 LAB
PATH REPORT.COMMENTS IMP SPEC: NORMAL
PATH REPORT.COMMENTS IMP SPEC: NORMAL
PATH REPORT.FINAL DX SPEC: NORMAL
PATH REPORT.GROSS SPEC: NORMAL
PATH REPORT.MICROSCOPIC SPEC OTHER STN: NORMAL
PATH REPORT.RELEVANT HX SPEC: NORMAL
PHOTO IMAGE: NORMAL

## 2023-08-02 ENCOUNTER — TRANSFERRED RECORDS (OUTPATIENT)
Dept: HEALTH INFORMATION MANAGEMENT | Facility: CLINIC | Age: 43
End: 2023-08-02

## 2023-09-01 ENCOUNTER — MEDICAL CORRESPONDENCE (OUTPATIENT)
Dept: HEALTH INFORMATION MANAGEMENT | Facility: CLINIC | Age: 43
End: 2023-09-01
Payer: COMMERCIAL

## 2023-09-01 DIAGNOSIS — I47.19 ATRIAL TACHYCARDIA (H): Primary | ICD-10-CM

## 2023-09-01 DIAGNOSIS — Z82.49 FAMILY HISTORY OF CORONARY ARTERY DISEASE: ICD-10-CM

## 2023-09-01 DIAGNOSIS — I49.3 PVC'S (PREMATURE VENTRICULAR CONTRACTIONS): ICD-10-CM

## 2023-10-06 ENCOUNTER — OFFICE VISIT (OUTPATIENT)
Dept: CARDIOLOGY | Facility: CLINIC | Age: 43
End: 2023-10-06
Attending: FAMILY MEDICINE
Payer: COMMERCIAL

## 2023-10-06 VITALS
HEIGHT: 64 IN | BODY MASS INDEX: 29.19 KG/M2 | SYSTOLIC BLOOD PRESSURE: 132 MMHG | DIASTOLIC BLOOD PRESSURE: 82 MMHG | WEIGHT: 171 LBS | HEART RATE: 68 BPM

## 2023-10-06 DIAGNOSIS — I49.3 PVC'S (PREMATURE VENTRICULAR CONTRACTIONS): Primary | ICD-10-CM

## 2023-10-06 PROCEDURE — 99204 OFFICE O/P NEW MOD 45 MIN: CPT | Performed by: INTERNAL MEDICINE

## 2023-10-06 PROCEDURE — 93000 ELECTROCARDIOGRAM COMPLETE: CPT | Performed by: INTERNAL MEDICINE

## 2023-10-06 NOTE — LETTER
10/6/2023    Loida Bynum MD  [address]  [facility] [address]    RE: Cora Connell       Dear Colleague,     I had the pleasure of seeing Cora Connell in the [facility].    SERVICE DATE: October 6, 2023    REFERRING PROVIDER:  Garry Katz MD  [address] [place] [address]    PRIMARY CARE PROVIDER:  Loida Bynum  [address]  [facility] [address]    REASON FOR VISIT:  Palpitations.    HISTORY OF PRESENT ILLNESS:  Cora Connell is a 42 year old female, new to cardiology, unaccompanied today.  She is a , , has 3 sons (ages 14, 11 and 7 years), never tobacco user.  She does not have any chronic medical illnesses and is not on any prescription medications.  BMI 29.  Regular exerciser.    A few months ago, patient started developing significant GERD symptoms.  In this context, she would get palpitations only at night when she lays down, lasting 10 minutes and associated with heartburn.  Her PCP started her on a PPI, obtained a heart monitor which showed a single 10 beat run of atrial tachycardia averaging 150 bpm, less than 0.1% premature atrial complexes, 2.5% premature ventricular complexes, leading to this referral.    Since being on the PPI, her heartburn has significantly improved and palpitations have nearly resolved.  No chest pain, dizziness, exercise intolerance.  She drinks no more than 3 small servings of caffeine daily, exercises regularly including running, social alcohol intake, no tobacco or recreational drugs.    No family history of arrhythmia, sudden cardiac death.  Father underwent CABG at age 67 years (smoker).    Patient's renal panel is normal.  Labs done at her OB/GYN show normal TSH and normal CBC.    ECG done today shows normal sinus rhythm, normal cardiac intervals.    I reviewed her outside vendor heart monitor obtained by her PCP.  As documented above.    On exam - /82, pulse 68/min and  "regular.  Regular heart sounds, no murmur, no carotid bruit.  Lungs are clear to auscultation.  No lower extremity edema.    DIAGNOSES/ASSESSMENT:  1.  Palpitations in context of recent GERD.  This is a healthy young female with no chronic medical diseases, normotensive, healthy lifestyle and regular exercise.  Tellingly, she only had palpitations when she got GERD when she lay down at night.  None during exercise.  Since being on her PPI, both symptoms have significantly improved.  Her 10 beat PAT is not of any clinical significance.  Patient was reassured.  2.  Premature ventricular complexes.  2.5%.  Echocardiogram to rule out structural heart disease.    PLAN:  -- Transthoracic echocardiogram.  -- Follow-up with me.      Villa Cooper MD      New patient.  Today's clinic visit entailed:  45 minutes spent by me on the date of the encounter doing chart review, history and exam, documentation and further activities per the note.          Vitals: /82   Pulse 68   Ht 1.626 m (5' 4\")   Wt 77.6 kg (171 lb)   BMI 29.35 kg/m    Wt Readings from Last 5 Encounters:   10/06/23 77.6 kg (171 lb)   04/09/21 81.6 kg (180 lb)   06/25/19 81.6 kg (180 lb)   05/20/19 81.8 kg (180 lb 4 oz)   02/09/18 83.5 kg (184 lb)         Encounter Diagnosis   Name Primary?    PVC's (premature ventricular contractions) Yes         Orders Placed This Encounter   Procedures    Follow-Up with Cardiology    EKG 12-lead complete w/read - Clinics (performed today)    Echocardiogram Complete           CURRENT MEDICATIONS:  Current Outpatient Medications   Medication Sig Dispense Refill    albuterol (PROAIR HFA) 108 (90 Base) MCG/ACT inhaler Inhale 2 puffs into the lungs every 4 hours as needed (short of breath, wheeze, cough) (Patient not taking: Reported on 10/6/2023) 8 g 0    azithromycin (ZITHROMAX) 250 MG tablet 500mg (2 tabs) on Day 1 then 250mg (1 tab) on Days 2-5 (Patient not taking: Reported on 10/6/2023) 6 tablet 0    calcium " carbonate (OS-RACIEL 500 MG [place]. [place]) 500 MG tablet Take 500 mg by mouth 2 times daily (Patient not taking: Reported on 10/6/2023)      guaiFENesin-codeine (ROBITUSSIN AC) 100-10 MG/5ML solution Take 5-10 mLs by mouth every 4 hours as needed for cough (Patient not taking: Reported on 10/6/2023) 180 mL 0    HYDROcodone-acetaminophen (NORCO) 5-325 MG per tablet Take 1-2 tablets by mouth every 4 hours as needed for other (Moderate to Severe Pain) (Patient not taking: Reported on 10/6/2023) 20 tablet 0    SERTRALINE HCL 50 mg  (Patient not taking: Reported on 10/6/2023)      TURMERIC PO Take by mouth 2 times daily  (Patient not taking: Reported on 10/6/2023)           ALLERGIES:  Allergies   Allergen Reactions    Penicillins Anaphylaxis    Ancef [Cefazolin Sodium]      No redness or hives or swelling noted with administration.  Tolerated without issue 2/9/18.       PAST MEDICAL HISTORY:    Past Medical History:   Diagnosis Date    Abnormal Pap smear 2000    LEEP    Depressive disorder     Postpartum depression     Varicosities     LE       PAST SURGICAL HISTORY:    Past Surgical History:   Procedure Laterality Date    EXCISE LESION UPPER EXTREMITY  1/2/2014    Procedure: EXCISE LESION UPPER EXTREMITY;  EXCISION OF LEFT SHOULDER MASS ;  Surgeon: Nena Zavaleta MD;  Location: [facility] REMOVAL OF TONSILS,<13 Y/O      5 years of age    LAPAROSCOPIC CYSTECTOMY OVARIAN (BENIGN) Right 2/9/2018    Procedure: LAPAROSCOPIC CYSTECTOMY OVARIAN (BENIGN);;  Surgeon: Latasha Arceo MD;  Location:  OR    LAPAROSCOPY DIAGNOSTIC (GYN) Right 2/9/2018    Procedure: LAPAROSCOPY DIAGNOSTIC (GYN);  DIAGNOSTIC LAPAROSCOPY, RIGHT OVARIAN CYSTECTOMY.;  Surgeon: Latasha Arceo MD;  Location:  OR    LEEP TX, CERVICAL         FAMILY HISTORY:    Family History   Problem Relation Age of Onset    Coronary Artery Disease Father         CABG at age 68 years, smoker.       SOCIAL HISTORY:    Social History     Socioeconomic History     Marital status:      Spouse name: None    Number of children: None    Years of education: None    Highest education level: None   Tobacco Use    Smoking status: Never    Smokeless tobacco: Never   Substance and Sexual Activity    Alcohol use: Yes     Comment: occasional.    Drug use: Never    Sexual activity: Yes     Partners: Male         Thank you for allowing me to participate in the care of your patient.      Sincerely,     Villa Cooper MD     [facility]  cc:   Garry Katz MD  [address] [place] [address]

## 2023-10-06 NOTE — PROGRESS NOTES
SERVICE DATE: October 6, 2023    REFERRING PROVIDER:  Garry Katz MD  [address] [place] [address]    PRIMARY CARE PROVIDER:  Loida Bynum  [address]  [facility] [address]    REASON FOR VISIT:  Palpitations.    HISTORY OF PRESENT ILLNESS:  Cora Connell is a 42 year old female, new to cardiology, unaccompanied today.  She is a , , has 3 sons (ages 14, 11 and 7 years), never tobacco user.  She does not have any chronic medical illnesses and is not on any prescription medications.  BMI 29.  Regular exerciser.    A few months ago, patient started developing significant GERD symptoms.  In this context, she would get palpitations only at night when she lays down, lasting 10 minutes and associated with heartburn.  Her PCP started her on a PPI, obtained a heart monitor which showed a single 10 beat run of atrial tachycardia averaging 150 bpm, less than 0.1% premature atrial complexes, 2.5% premature ventricular complexes, leading to this referral.    Since being on the PPI, her heartburn has significantly improved and palpitations have nearly resolved.  No chest pain, dizziness, exercise intolerance.  She drinks no more than 3 small servings of caffeine daily, exercises regularly including running, social alcohol intake, no tobacco or recreational drugs.    No family history of arrhythmia, sudden cardiac death.  Father underwent CABG at age 67 years (smoker).    Patient's renal panel is normal.  Labs done at her OB/GYN show normal TSH and normal CBC.    ECG done today shows normal sinus rhythm, normal cardiac intervals.    I reviewed her outside vendor heart monitor obtained by her PCP.  As documented above.    On exam - /82, pulse 68/min and regular.  Regular heart sounds, no murmur, no carotid bruit.  Lungs are clear to auscultation.  No lower extremity edema.    DIAGNOSES/ASSESSMENT:  1.  Palpitations in context of recent GERD.  This is a healthy young  "female with no chronic medical diseases, normotensive, healthy lifestyle and regular exercise.  Tellingly, she only had palpitations when she got GERD when she lay down at night.  None during exercise.  Since being on her PPI, both symptoms have significantly improved.  Her 10 beat PAT is not of any clinical significance.  Patient was reassured.  2.  Premature ventricular complexes.  2.5%.  Echocardiogram to rule out structural heart disease.    PLAN:  -- Transthoracic echocardiogram.  -- Follow-up with me.      Villa Cooper MD      New patient.  Today's clinic visit entailed:  45 minutes spent by me on the date of the encounter doing chart review, history and exam, documentation and further activities per the note.          Vitals: /82   Pulse 68   Ht 1.626 m (5' 4\")   Wt 77.6 kg (171 lb)   BMI 29.35 kg/m    Wt Readings from Last 5 Encounters:   10/06/23 77.6 kg (171 lb)   04/09/21 81.6 kg (180 lb)   06/25/19 81.6 kg (180 lb)   05/20/19 81.8 kg (180 lb 4 oz)   02/09/18 83.5 kg (184 lb)         Encounter Diagnosis   Name Primary?    PVC's (premature ventricular contractions) Yes         Orders Placed This Encounter   Procedures    Follow-Up with Cardiology    EKG 12-lead complete w/read - Clinics (performed today)    Echocardiogram Complete           CURRENT MEDICATIONS:  Current Outpatient Medications   Medication Sig Dispense Refill    albuterol (PROAIR HFA) 108 (90 Base) MCG/ACT inhaler Inhale 2 puffs into the lungs every 4 hours as needed (short of breath, wheeze, cough) (Patient not taking: Reported on 10/6/2023) 8 g 0    azithromycin (ZITHROMAX) 250 MG tablet 500mg (2 tabs) on Day 1 then 250mg (1 tab) on Days 2-5 (Patient not taking: Reported on 10/6/2023) 6 tablet 0    calcium carbonate (OS-RACIEL 500 MG [place]. [place]) 500 MG tablet Take 500 mg by mouth 2 times daily (Patient not taking: Reported on 10/6/2023)      guaiFENesin-codeine (ROBITUSSIN AC) 100-10 MG/5ML solution Take 5-10 mLs by mouth " every 4 hours as needed for cough (Patient not taking: Reported on 10/6/2023) 180 mL 0    HYDROcodone-acetaminophen (NORCO) 5-325 MG per tablet Take 1-2 tablets by mouth every 4 hours as needed for other (Moderate to Severe Pain) (Patient not taking: Reported on 10/6/2023) 20 tablet 0    SERTRALINE HCL 50 mg  (Patient not taking: Reported on 10/6/2023)      TURMERIC PO Take by mouth 2 times daily  (Patient not taking: Reported on 10/6/2023)           ALLERGIES:  Allergies   Allergen Reactions    Penicillins Anaphylaxis    Ancef [Cefazolin Sodium]      No redness or hives or swelling noted with administration.  Tolerated without issue 2/9/18.       PAST MEDICAL HISTORY:    Past Medical History:   Diagnosis Date    Abnormal Pap smear 2000    LEEP    Depressive disorder     Postpartum depression     Varicosities     LE       PAST SURGICAL HISTORY:    Past Surgical History:   Procedure Laterality Date    EXCISE LESION UPPER EXTREMITY  1/2/2014    Procedure: EXCISE LESION UPPER EXTREMITY;  EXCISION OF LEFT SHOULDER MASS ;  Surgeon: Nena Zavaleta MD;  Location: [facility] REMOVAL OF TONSILS,<11 Y/O      5 years of age    LAPAROSCOPIC CYSTECTOMY OVARIAN (BENIGN) Right 2/9/2018    Procedure: LAPAROSCOPIC CYSTECTOMY OVARIAN (BENIGN);;  Surgeon: Latasha Arceo MD;  Location:  OR    LAPAROSCOPY DIAGNOSTIC (GYN) Right 2/9/2018    Procedure: LAPAROSCOPY DIAGNOSTIC (GYN);  DIAGNOSTIC LAPAROSCOPY, RIGHT OVARIAN CYSTECTOMY.;  Surgeon: Latasha Arceo MD;  Location:  OR    LEEP TX, CERVICAL         FAMILY HISTORY:    Family History   Problem Relation Age of Onset    Coronary Artery Disease Father         CABG at age 68 years, smoker.       SOCIAL HISTORY:    Social History     Socioeconomic History    Marital status:      Spouse name: None    Number of children: None    Years of education: None    Highest education level: None   Tobacco Use    Smoking status: Never    Smokeless tobacco: Never   Substance  and Sexual Activity    Alcohol use: Yes     Comment: occasional.    Drug use: Never    Sexual activity: Yes     Partners: Male

## 2023-10-24 ENCOUNTER — HOSPITAL ENCOUNTER (OUTPATIENT)
Dept: CARDIOLOGY | Facility: CLINIC | Age: 43
Discharge: HOME | End: 2023-10-24
Attending: INTERNAL MEDICINE | Admitting: INTERNAL MEDICINE
Payer: COMMERCIAL

## 2023-10-24 DIAGNOSIS — I49.3 PVC'S (PREMATURE VENTRICULAR CONTRACTIONS): ICD-10-CM

## 2023-10-24 LAB — LVEF ECHO: NORMAL

## 2023-10-24 PROCEDURE — 93306 TTE W/DOPPLER COMPLETE: CPT | Mod: 26 | Performed by: INTERNAL MEDICINE

## 2023-10-24 PROCEDURE — 93306 TTE W/DOPPLER COMPLETE: CPT

## 2023-11-07 ENCOUNTER — VIRTUAL VISIT (OUTPATIENT)
Dept: CARDIOLOGY | Facility: CLINIC | Age: 43
End: 2023-11-07
Attending: INTERNAL MEDICINE
Payer: COMMERCIAL

## 2023-11-07 DIAGNOSIS — I49.3 PVC'S (PREMATURE VENTRICULAR CONTRACTIONS): ICD-10-CM

## 2023-11-07 PROCEDURE — 99214 OFFICE O/P EST MOD 30 MIN: CPT | Mod: VID | Performed by: INTERNAL MEDICINE

## 2023-11-07 RX ORDER — MULTIVITAMIN WITH IRON
1 TABLET ORAL DAILY
COMMUNITY

## 2023-11-07 NOTE — PROGRESS NOTES
Cora is a 43 year old who is being evaluated via a billable video visit.      How would you like to obtain your AVS? MyChart  If the video visit is dropped, the invitation should be resent by: Text to cell phone: [phone]  Will anyone else be joining your video visit? No        Video-Visit Details    Type of service:  Video Visit     Originating Location (pt. Location): Home    Distant Location (provider location):  On-site  Platform used for Video Visit: [employer]    The vitals obtained by patient is    Review Of Systems  Skin: NEGATIVE  Eyes:Ears/Nose/Throat: NEGATIVE  Respiratory: NEGATIVE  Cardiovascular:NEGATIVE  Gastrointestinal: NEGATIVE  Genitourinary:NEGATIVE  Musculoskeletal: NEGATIVE  Neurologic: NEGATIVE  Psychiatric: NEGATIVE  Hematologic/Lymphatic/Immunologic: NEGATIVE  Endocrine:  NEGATIVE    Telephone number of patient: [phone]

## 2023-11-07 NOTE — PROGRESS NOTES
SERVICE DATE: November 7, 2023    VIDEO VISIT.    PRIMARY CARE AND REFERRING PROVIDER:  Garry Katz  [address]  [facility] [address]    REASON FOR VISIT:  Follow-up of palpitations and premature ventricular complexes.    HISTORY OF PRESENT ILLNESS:  Cora Connell is 43 year old female, , has 3 sons (ages 14, 11 and 7 years), never tobacco user. She does not have any chronic medical illnesses and is not on any prescription medications.  BMI 29. Regular exerciser.     I had seen her recently on October 6, 2023 for palpitations in the context of significant GERD symptoms.  She was only getting palpitations at night when she lay down, associated with heartburn.  Her heart monitor had shown 2.5% premature ventricular complexes which led to the referral.    Since being on omeprazole and making some dietary changes, her GERD has improved and her palpitations have resolved.    I independently interpreted her recent transthoracic echocardiogram dated 10/24/2023.  Normal left ventricular size and systolic function, LVEF 60%, normal right ventricular size and systolic function, normal atrial size, normal cardiac valves.    Her labs are reassuring with a normal renal panel, normal TSH and CBC (done at her OB/GYN clinic).      Normal resting ECG.    DIAGNOSES/ASSESSMENT:  Subjective palpitations secondary to heartburn.  Resolved.  Structurally normal heart.    Premature ventricular complexes, infrequent.  She had 2.5% PVCs on her initial heart monitor that did not correlate with her symptoms.  She has no symptoms, LVEF is preserved and she has a structurally normal heart.  No additional treatment or evaluation indicated.  GERD.  On omeprazole.  Improved.  We will follow-up with her PCP.    PLAN:  Patient reassured.  Follow-up with cardiology, as needed.      Villa Cooper MD      Established patient.   Today's clinic visit entailed:  Review of external notes as documented  elsewhere in note  Review of the result(s) of each unique test - I reviewed results of CBC, renal panel, thyroid labs, ECG.  The following tests were independently interpreted by me as noted in my documentation: Transthoracic echocardiogram.  The level of medical decision making during this visit was of moderate complexity.          CURRENT MEDICATIONS:  Current Outpatient Medications   Medication Sig Dispense Refill    magnesium 250 MG tablet Take 1 tablet by mouth daily      vitamin C with B complex (B COMPLEX-C) tablet Take 1 tablet by mouth daily      albuterol (PROAIR HFA) 108 (90 Base) MCG/ACT inhaler Inhale 2 puffs into the lungs every 4 hours as needed (short of breath, wheeze, cough) (Patient not taking: Reported on 10/6/2023) 8 g 0         ALLERGIES:  Allergies   Allergen Reactions    Penicillins Anaphylaxis    Ancef [Cefazolin Sodium]      No redness or hives or swelling noted with administration.  Tolerated without issue 2/9/18.       PAST MEDICAL HISTORY:    Past Medical History:   Diagnosis Date    Abnormal Pap smear 2000    LEEP    Depressive disorder     Postpartum depression     Varicosities     LE       PAST SURGICAL HISTORY:    Past Surgical History:   Procedure Laterality Date    EXCISE LESION UPPER EXTREMITY  1/2/2014    Procedure: EXCISE LESION UPPER EXTREMITY;  EXCISION OF LEFT SHOULDER MASS ;  Surgeon: Nena Zavaleta MD;  Location: [facility] REMOVAL OF TONSILS,<13 Y/O      5 years of age    LAPAROSCOPIC CYSTECTOMY OVARIAN (BENIGN) Right 2/9/2018    Procedure: LAPAROSCOPIC CYSTECTOMY OVARIAN (BENIGN);;  Surgeon: Latasha Arceo MD;  Location:  OR    LAPAROSCOPY DIAGNOSTIC (GYN) Right 2/9/2018    Procedure: LAPAROSCOPY DIAGNOSTIC (GYN);  DIAGNOSTIC LAPAROSCOPY, RIGHT OVARIAN CYSTECTOMY.;  Surgeon: Latasha Arceo MD;  Location:  OR    LEEP TX, CERVICAL         FAMILY HISTORY:    Family History   Problem Relation Age of Onset    Coronary Artery Disease Father         CABG at  age 68 years, smoker.       SOCIAL HISTORY:    Social History     Socioeconomic History    Marital status:    Tobacco Use    Smoking status: Never    Smokeless tobacco: Never   Substance and Sexual Activity    Alcohol use: Yes     Comment: occasional.    Drug use: Never    Sexual activity: Yes     Partners: Male

## 2023-11-07 NOTE — LETTER
11/7/2023    Garry Katz MD  [address]  [facility] [address]    RE: Cora Connell       Dear Colleague,     I had the pleasure of seeing Cora Connell in the [facility].  Cora is a 43 year old who is being evaluated via a billable video visit.      How would you like to obtain your AVS? MyChart  If the video visit is dropped, the invitation should be resent by: Text to cell phone: [phone]  Will anyone else be joining your video visit? No        Video-Visit Details    Type of service:  Video Visit     Originating Location (pt. Location): Home    Distant Location (provider location):  On-site  Platform used for Video Visit: Mobiscope    The vitals obtained by patient is    Review Of Systems  Skin: NEGATIVE  Eyes:Ears/Nose/Throat: NEGATIVE  Respiratory: NEGATIVE  Cardiovascular:NEGATIVE  Gastrointestinal: NEGATIVE  Genitourinary:NEGATIVE  Musculoskeletal: NEGATIVE  Neurologic: NEGATIVE  Psychiatric: NEGATIVE  Hematologic/Lymphatic/Immunologic: NEGATIVE  Endocrine:  NEGATIVE    Telephone number of patient: [phone]      SERVICE DATE: November 7, 2023    VIDEO VISIT.    PRIMARY CARE AND REFERRING PROVIDER:  Garry Katz  [address]  [place] [address]    REASON FOR VISIT:  Follow-up of palpitations and premature ventricular complexes.    HISTORY OF PRESENT ILLNESS:  Cora Connell is 43 year old female, , has 3 sons (ages 14, 11 and 7 years), never tobacco user. She does not have any chronic medical illnesses and is not on any prescription medications.  BMI 29. Regular exerciser.     I had seen her recently on October 6, 2023 for palpitations in the context of significant GERD symptoms.  She was only getting palpitations at night when she lay down, associated with heartburn.  Her heart monitor had shown 2.5% premature ventricular complexes which led to the referral.    Since being on omeprazole and making some dietary changes,  her GERD has improved and her palpitations have resolved.    I independently interpreted her recent transthoracic echocardiogram dated 10/24/2023.  Normal left ventricular size and systolic function, LVEF 60%, normal right ventricular size and systolic function, normal atrial size, normal cardiac valves.    Her labs are reassuring with a normal renal panel, normal TSH and CBC (done at her OB/GYN clinic).      Normal resting ECG.    DIAGNOSES/ASSESSMENT:  Subjective palpitations secondary to heartburn.  Resolved.  Structurally normal heart.    Premature ventricular complexes, infrequent.  She had 2.5% PVCs on her initial heart monitor that did not correlate with her symptoms.  She has no symptoms, LVEF is preserved and she has a structurally normal heart.  No additional treatment or evaluation indicated.  GERD.  On omeprazole.  Improved.  We will follow-up with her PCP.    PLAN:  Patient reassured.  Follow-up with cardiology, as needed.      Villa Cooper MD      Established patient.   Today's clinic visit entailed:  Review of external notes as documented elsewhere in note  Review of the result(s) of each unique test - I reviewed results of CBC, renal panel, thyroid labs, ECG.  The following tests were independently interpreted by me as noted in my documentation: Transthoracic echocardiogram.  The level of medical decision making during this visit was of moderate complexity.          CURRENT MEDICATIONS:  Current Outpatient Medications   Medication Sig Dispense Refill    magnesium 250 MG tablet Take 1 tablet by mouth daily      vitamin C with B complex (B COMPLEX-C) tablet Take 1 tablet by mouth daily      albuterol (PROAIR HFA) 108 (90 Base) MCG/ACT inhaler Inhale 2 puffs into the lungs every 4 hours as needed (short of breath, wheeze, cough) (Patient not taking: Reported on 10/6/2023) 8 g 0         ALLERGIES:  Allergies   Allergen Reactions    Penicillins Anaphylaxis    Ancef [Cefazolin Sodium]      No  redness or hives or swelling noted with administration.  Tolerated without issue 2/9/18.       PAST MEDICAL HISTORY:    Past Medical History:   Diagnosis Date    Abnormal Pap smear 2000    LEEP    Depressive disorder     Postpartum depression     Varicosities     LE       PAST SURGICAL HISTORY:    Past Surgical History:   Procedure Laterality Date    EXCISE LESION UPPER EXTREMITY  1/2/2014    Procedure: EXCISE LESION UPPER EXTREMITY;  EXCISION OF LEFT SHOULDER MASS ;  Surgeon: Nena Zavaleta MD;  Location: [facility] REMOVAL OF TONSILS,<13 Y/O      5 years of age    LAPAROSCOPIC CYSTECTOMY OVARIAN (BENIGN) Right 2/9/2018    Procedure: LAPAROSCOPIC CYSTECTOMY OVARIAN (BENIGN);;  Surgeon: Latasha Arceo MD;  Location:  OR    LAPAROSCOPY DIAGNOSTIC (GYN) Right 2/9/2018    Procedure: LAPAROSCOPY DIAGNOSTIC (GYN);  DIAGNOSTIC LAPAROSCOPY, RIGHT OVARIAN CYSTECTOMY.;  Surgeon: Latasha Arceo MD;  Location:  OR    LEEP TX, CERVICAL         FAMILY HISTORY:    Family History   Problem Relation Age of Onset    Coronary Artery Disease Father         CABG at age 68 years, smoker.       SOCIAL HISTORY:    Social History     Socioeconomic History    Marital status:    Tobacco Use    Smoking status: Never    Smokeless tobacco: Never   Substance and Sexual Activity    Alcohol use: Yes     Comment: occasional.    Drug use: Never    Sexual activity: Yes     Partners: Male     Thank you for allowing me to participate in the care of your patient.      Sincerely,     Villa Cooper MD   [facility]  cc:   Villa Cooper MD  [address] [place] [id number]

## 2023-12-03 ENCOUNTER — HEALTH MAINTENANCE LETTER (OUTPATIENT)
Age: 43
End: 2023-12-03

## 2024-02-11 ENCOUNTER — HEALTH MAINTENANCE LETTER (OUTPATIENT)
Age: 44
End: 2024-02-11

## 2024-08-22 ENCOUNTER — HOSPITAL ENCOUNTER (EMERGENCY)
Facility: CLINIC | Age: 44
Discharge: HOME | End: 2024-08-22
Attending: EMERGENCY MEDICINE | Admitting: EMERGENCY MEDICINE
Payer: COMMERCIAL

## 2024-08-22 ENCOUNTER — APPOINTMENT (OUTPATIENT)
Dept: GENERAL RADIOLOGY | Facility: CLINIC | Age: 44
End: 2024-08-22
Attending: EMERGENCY MEDICINE
Payer: COMMERCIAL

## 2024-08-22 VITALS
OXYGEN SATURATION: 96 % | RESPIRATION RATE: 18 BRPM | SYSTOLIC BLOOD PRESSURE: 125 MMHG | WEIGHT: 165 LBS | BODY MASS INDEX: 28.17 KG/M2 | TEMPERATURE: 97.4 F | HEIGHT: 64 IN | HEART RATE: 50 BPM | DIASTOLIC BLOOD PRESSURE: 56 MMHG

## 2024-08-22 DIAGNOSIS — R00.2 PALPITATIONS: ICD-10-CM

## 2024-08-22 DIAGNOSIS — I49.3 SYMPTOMATIC PVCS: ICD-10-CM

## 2024-08-22 DIAGNOSIS — R07.89 CHEST TIGHTNESS: ICD-10-CM

## 2024-08-22 LAB
ALBUMIN SERPL BCG-MCNC: 4.8 G/DL (ref 3.5–5.2)
ALP SERPL-CCNC: 52 U/L (ref 40–150)
ALT SERPL W P-5'-P-CCNC: 14 U/L (ref 0–50)
ANION GAP SERPL CALCULATED.3IONS-SCNC: 12 MMOL/L (ref 7–15)
AST SERPL W P-5'-P-CCNC: 15 U/L (ref 0–45)
BASOPHILS # BLD AUTO: 0 10E3/UL (ref 0–0.2)
BASOPHILS NFR BLD AUTO: 0 %
BILIRUB SERPL-MCNC: 0.4 MG/DL
BUN SERPL-MCNC: 10.9 MG/DL (ref 6–20)
CALCIUM SERPL-MCNC: 9.8 MG/DL (ref 8.8–10.4)
CHLORIDE SERPL-SCNC: 100 MMOL/L (ref 98–107)
CREAT SERPL-MCNC: 0.69 MG/DL (ref 0.51–0.95)
D DIMER PPP FEU-MCNC: <0.27 UG/ML FEU (ref 0–0.5)
EGFRCR SERPLBLD CKD-EPI 2021: >90 ML/MIN/1.73M2
EOSINOPHIL # BLD AUTO: 0.2 10E3/UL (ref 0–0.7)
EOSINOPHIL NFR BLD AUTO: 2 %
ERYTHROCYTE [DISTWIDTH] IN BLOOD BY AUTOMATED COUNT: 11.9 % (ref 10–15)
GLUCOSE SERPL-MCNC: 92 MG/DL (ref 70–99)
HCG SERPL QL: NEGATIVE
HCO3 SERPL-SCNC: 25 MMOL/L (ref 22–29)
HCT VFR BLD AUTO: 41.3 % (ref 35–47)
HGB BLD-MCNC: 14.7 G/DL (ref 11.7–15.7)
IMM GRANULOCYTES # BLD: 0 10E3/UL
IMM GRANULOCYTES NFR BLD: 0 %
LYMPHOCYTES # BLD AUTO: 1.9 10E3/UL (ref 0.8–5.3)
LYMPHOCYTES NFR BLD AUTO: 29 %
MAGNESIUM SERPL-MCNC: 1.9 MG/DL (ref 1.7–2.3)
MCH RBC QN AUTO: 34 PG (ref 26.5–33)
MCHC RBC AUTO-ENTMCNC: 35.6 G/DL (ref 31.5–36.5)
MCV RBC AUTO: 96 FL (ref 78–100)
MONOCYTES # BLD AUTO: 0.5 10E3/UL (ref 0–1.3)
MONOCYTES NFR BLD AUTO: 7 %
NEUTROPHILS # BLD AUTO: 4.1 10E3/UL (ref 1.6–8.3)
NEUTROPHILS NFR BLD AUTO: 61 %
NRBC # BLD AUTO: 0 10E3/UL
NRBC BLD AUTO-RTO: 0 /100
NT-PROBNP SERPL-MCNC: 113 PG/ML (ref 0–450)
PLATELET # BLD AUTO: 246 10E3/UL (ref 150–450)
POTASSIUM SERPL-SCNC: 3.8 MMOL/L (ref 3.4–5.3)
PROT SERPL-MCNC: 7.6 G/DL (ref 6.4–8.3)
RBC # BLD AUTO: 4.32 10E6/UL (ref 3.8–5.2)
SODIUM SERPL-SCNC: 137 MMOL/L (ref 135–145)
TROPONIN T SERPL HS-MCNC: <6 NG/L
TSH SERPL DL<=0.005 MIU/L-ACNC: 1.76 UIU/ML (ref 0.3–4.2)
WBC # BLD AUTO: 6.6 10E3/UL (ref 4–11)

## 2024-08-22 PROCEDURE — 85379 FIBRIN DEGRADATION QUANT: CPT | Performed by: EMERGENCY MEDICINE

## 2024-08-22 PROCEDURE — 36415 COLL VENOUS BLD VENIPUNCTURE: CPT | Performed by: EMERGENCY MEDICINE

## 2024-08-22 PROCEDURE — 99284 EMERGENCY DEPT VISIT MOD MDM: CPT | Mod: 25

## 2024-08-22 PROCEDURE — 85049 AUTOMATED PLATELET COUNT: CPT | Performed by: EMERGENCY MEDICINE

## 2024-08-22 PROCEDURE — 84703 CHORIONIC GONADOTROPIN ASSAY: CPT | Performed by: EMERGENCY MEDICINE

## 2024-08-22 PROCEDURE — 82565 ASSAY OF CREATININE: CPT | Performed by: EMERGENCY MEDICINE

## 2024-08-22 PROCEDURE — 83735 ASSAY OF MAGNESIUM: CPT | Performed by: EMERGENCY MEDICINE

## 2024-08-22 PROCEDURE — 83880 ASSAY OF NATRIURETIC PEPTIDE: CPT | Performed by: EMERGENCY MEDICINE

## 2024-08-22 PROCEDURE — 84484 ASSAY OF TROPONIN QUANT: CPT | Performed by: EMERGENCY MEDICINE

## 2024-08-22 PROCEDURE — 71046 X-RAY EXAM CHEST 2 VIEWS: CPT

## 2024-08-22 PROCEDURE — 84443 ASSAY THYROID STIM HORMONE: CPT | Performed by: EMERGENCY MEDICINE

## 2024-08-22 ASSESSMENT — COLUMBIA-SUICIDE SEVERITY RATING SCALE - C-SSRS
6. HAVE YOU EVER DONE ANYTHING, STARTED TO DO ANYTHING, OR PREPARED TO DO ANYTHING TO END YOUR LIFE?: NO
1. IN THE PAST MONTH, HAVE YOU WISHED YOU WERE DEAD OR WISHED YOU COULD GO TO SLEEP AND NOT WAKE UP?: NO
2. HAVE YOU ACTUALLY HAD ANY THOUGHTS OF KILLING YOURSELF IN THE PAST MONTH?: NO

## 2024-08-22 ASSESSMENT — ACTIVITIES OF DAILY LIVING (ADL)
ADLS_ACUITY_SCORE: 37

## 2024-08-22 NOTE — ED PROVIDER NOTES
"  Emergency Department Note      History of Present Illness     Chief Complaint   Palpitations      HPI   Cora Connell is a 43 year old female with a history of tricuspid valve regurgitation who presents to the ED for palpitations. Cora states she started to experience chest heaviness, a tachycardic or \"off rhythm\" feeling, loss of sleep, shortness of breath exacerbated by exertion, and dizziness 4 days ago that has persisted since. States she has been having episodes of theses symptoms for the past few months that she was scheduled to see her Cardiologist for in 6 days but feels she cannot wait. Notes one of these episodes occurred during an extended car ride to [place] 3 weeks ago with additional symptoms of paresthesia to the right side of her face, burning pain to her right leg, and a right-sided headache. These symptoms spontaneously resolved and she has not yet been seen for them. Notes a history of tricuspid regurgitation but she feels her current symptoms are not consistent with those she associates with the condition. Denies recent surgeries or illness. Denies hormonal medications. Denies history of DVT/PE, cancer, or smoking.    Independent Historian   None    Review of External Notes   Reviewed cardiology visit note to Dr. Cooper on 11/7/2023.  Patient was noted to have significant palpitations.  Heart monitor showed 2.5% PVCs.  That seemed related to GERD and symptoms r improved on omeprazole.  Echo was reviewed and felt reassuring.    Past Medical History     Medical History and Problem List   Depression  Tricuspid regurgitation     Medications   Omeprazole  Propranolol  Albuterol    Surgical History   Upper extremity lesion excision  Tonsillectomy  Ovarian cystectomy  Cervical leep     Physical Exam     Patient Vitals for the past 24 hrs:   BP Temp Temp src Pulse Resp SpO2 Height Weight   08/22/24 1951 125/56 97.4  F (36.3  C) Temporal 50 -- 96 % -- --   08/22/24 1540 (!) 160/95 98  F (36.7  C) " "Temporal -- -- -- -- --   08/22/24 1539 -- -- -- 85 18 100 % 1.626 m (5' 4\") 74.8 kg (165 lb)     Physical Exam  General: Well-nourished  Eyes: PERRL, conjunctivae pink no scleral icterus or conjunctival injection  ENT:  Moist mucus membranes, posterior oropharynx clear without erythema or exudates  Respiratory:  Lungs clear to auscultation bilaterally, no crackles/rubs/wheezes.  Good air movement  CV: Irregularly irregular heart rate with regularity corresponding to PVCs on the monitor.  No murmurs rubs or gallops.  GI:  Abdomen soft and non-distended.  Normoactive BS.  No tenderness, guarding or rebound  Skin: Warm, dry.  No rashes or petechiae  Musculoskeletal: No peripheral edema or calf tenderness  Neuro: Alert and oriented to person/place/time  Psychiatric: Normal affect      Diagnostics     Lab Results   Labs Ordered and Resulted from Time of ED Arrival to Time of ED Departure   CBC WITH PLATELETS AND DIFFERENTIAL - Abnormal       Result Value    WBC Count 6.6      RBC Count 4.32      Hemoglobin 14.7      Hematocrit 41.3      MCV 96      MCH 34.0 (*)     MCHC 35.6      RDW 11.9      Platelet Count 246      % Neutrophils 61      % Lymphocytes 29      % Monocytes 7      % Eosinophils 2      % Basophils 0      % Immature Granulocytes 0      NRBCs per 100 WBC 0      Absolute Neutrophils 4.1      Absolute Lymphocytes 1.9      Absolute Monocytes 0.5      Absolute Eosinophils 0.2      Absolute Basophils 0.0      Absolute Immature Granulocytes 0.0      Absolute NRBCs 0.0     COMPREHENSIVE METABOLIC PANEL - Normal    Sodium 137      Potassium 3.8      Carbon Dioxide (CO2) 25      Anion Gap 12      Urea Nitrogen 10.9      Creatinine 0.69      GFR Estimate >90      Calcium 9.8      Chloride 100      Glucose 92      Alkaline Phosphatase 52      AST 15      ALT 14      Protein Total 7.6      Albumin 4.8      Bilirubin Total 0.4     MAGNESIUM - Normal    Magnesium 1.9     TROPONIN T, HIGH SENSITIVITY - Normal    Troponin " T, High Sensitivity <6     NT PROBNP INPATIENT - Normal    N terminal Pro BNP Inpatient 113     HCG QUALITATIVE PREGNANCY - Normal    hCG Serum Qualitative Negative     TSH WITH FREE T4 REFLEX - Normal    TSH 1.76     D DIMER QUANTITATIVE - Normal    D-Dimer Quantitative <0.27     TROPONIN T, HIGH SENSITIVITY       Imaging   Chest XR,  PA & LAT   Final Result   IMPRESSION:       Lungs are clear. No evidence of pneumonia. No pleural effusions or pneumothorax. Normal pulmonary vascularity. Nonenlarged cardiac silhouette.          EKG   ECG taken at 1533, ECG read at 1539  Sinus tachycardia with marker sinus arrhythmia with frequent premature ventricular complexes  Nonspecific ST abnormality   Rate 99 bpm. MT interval 134 ms. QRS duration 82 ms. QT/QTc 324/415 ms. P-R-T axes 66 70 51.    Independent Interpretation   None    ED Course      Medications Administered   Medications - No data to display    Procedures   Procedures     Discussion of Management   None    ED Course   ED Course as of 08/22/24 2151   Thu Aug 22, 2024   1533 I obtained history and performed a physical exam as noted above.    I have to the patient all the results and plan for follow-up.  Additional Documentation  None    Medical Decision Making / Diagnosis     CMS Diagnoses: None    MIPS       None    MDM   Cora Connell is a 43 year old female with a history of PVCs who comes today with worsening symptoms of chest tightness, dizziness and an unwell feeling with palpitations.  On examination she is having quite a few PVCs.  Her workup was, however, otherwise reassuring.  No electrolyte abnormalities, anemia, renal failure.  No elevation of her troponin or ischemic changes on her EKG.  BNP is normal and there is no signs of cardiomyopathy or congestive heart failure.  No malignant arrhythmias on EKG.  Thyroid testing was normal.  D-dimer was negative and she is otherwise low risk for pulmonary embolism.  Chest x-ray was clear without signs of  pneumonia or pneumothorax.  No signs of pulmonary edema.  All of her work appears reassuring and I do feel she is safe for discharge home to follow-up with Dr. Cooper for her regularly scheduled appointment next Wednesday.  She is of course asked to return if she has any new or worsening symptoms.    Disposition   The patient was discharged.     Diagnosis     ICD-10-CM    1. Palpitations  R00.2       2. Symptomatic PVCs  I49.3       3. Chest tightness  R07.89            Discharge Medications   Discharge Medication List as of 8/22/2024  7:56 PM            Scribe Disclosure:  I, Yusra Stanley, am serving as a scribe at 3:53 PM on 8/22/2024 to document services personally performed by Glenda Tellez MD based on my observations and the provider's statements to me.        Glenda Tellez MD  08/22/24 5141

## 2024-08-22 NOTE — ED TRIAGE NOTES
[facility]  ED Arrival Note    Arrives through triage. ABC's intact. A &O X4. . Pt arrives with c/o palpitations and feeling tired. Patient has been working with her cardiologist on ongoing symptoms.       Visitors during triage: None      Triage Interventions: N/A    Ambulatory: Yes    Meets Stroke Criteria?: No    Meets Trauma Criteria?: No    Shock Index (HR/SBP): <0.8, for provider reference    Directed to: Triage Lobby    Pronouns: she/her       Triage Assessment (Adult)       Row Name 08/22/24 1537          Triage Assessment    Airway WDL WDL        Respiratory WDL    Respiratory WDL WDL        Skin Circulation/Temperature WDL    Skin Circulation/Temperature WDL WDL        Cardiac WDL    Cardiac WDL X        Peripheral/Neurovascular WDL    Peripheral Neurovascular WDL WDL        Cognitive/Neuro/Behavioral WDL    Cognitive/Neuro/Behavioral WDL WDL

## 2024-08-23 NOTE — DISCHARGE INSTRUCTIONS
*You may resume diet and activities as tolerated.  *No new medications.   *Follow-up with your doctor for a recheck in 2-3 days.  Follow-up for Dr Cooper as previously scheduled.  *Return if you develop difficulty in breathing, faint or feel like you will faint or become worse in any way.    Discharge Instructions  Palpitations    Palpitations are an unusual awareness of your heartbeat. People often describe this as the heart skipping, fluttering, racing, irregular, or pounding. At this time, your provider has found no signs that your palpitations are due to a serious or life-threatening condition. However, sometimes there is a serious problem that does not show up right away.    Palpitations can be caused by caffeine, cigarettes, diet pills, energy drinks or supplements, other stimulants, and medications and street drugs. They can also be caused by anxiety, hormone conditions such as high thyroid, and other medical conditions. Sometimes they are a sign of abnormal rhythm in the heart. At this time, your provider did not find any dangerous cause of your symptoms.    Generally, every Emergency Department visit should have a follow-up clinic visit with either a primary or a specialty clinic/provider. Please follow-up as instructed by your emergency provider today.    Return to the Emergency Department if:  You get chest pain or tightness.  You are short of breath.  You get very weak or tired.  You pass out or faint.  Your heart rate is over 120 beats per minute for more than 10 minutes while you are resting.   You have anything else that worries you.    What can I do to help myself?  Fill any prescriptions the provider gave you and take them right away.   Follow your provider s instructions about the prescription medicines you are on. Sometimes the provider may tell you to stop taking a medicine or change the dose.  If you smoke, this may be a good time to quit! The less you can smoke, the better.  Do not use energy  drinks, diet pills, or stimulants. Limit your use of caffeine.  If you were given a prescription for medicine here today, be sure to read all of the information (including the package insert) that comes with your prescription.  This will include important information about the medicine, its side effects, and any warnings that you need to know about.  The pharmacist who fills the prescription can provide more information and answer questions you may have about the medicine.  If you have questions or concerns that the pharmacist cannot address, please call or return to the Emergency Department.     Remember that you can always come back to the Emergency Department if you are not able to see your regular provider in the amount of time listed above, if you get any new symptoms, or if there is anything that worries you.    Discharge Instructions  Chest Pain    You have been seen today for chest pain or discomfort.  At this time, your provider has found no signs that your chest pain is due to a serious or life-threatening condition, (or you have declined more testing and/or admission to the hospital). However, sometimes there is a serious problem that does not show up right away. Your evaluation today may not be complete and you may need further testing and evaluation.     Generally, every Emergency Department visit should have a follow-up clinic visit with either a primary or a specialty clinic/provider. Please follow-up as instructed by your emergency provider today.  Return to the Emergency Department if:  Your chest pain changes, gets worse, starts to happen more often, or comes with less activity.  You are newly short of breath.  You get very weak or tired.  You pass out or faint.  You have any new symptoms, like fever, cough, numb legs, or you cough up blood.  You have anything else that worries you.    Until you follow-up with your regular provider, please do the following:  Take one aspirin daily unless you have  an allergy or are told not to by your provider.  If a stress test appointment has been made, go to the appointment.  If you have questions, contact your regular provider.  Follow-up with your regular provider/clinic as directed; this is very important.    If you were given a prescription for medicine here today, be sure to read all of the information (including the package insert) that comes with your prescription.  This will include important information about the medicine, its side effects, and any warnings that you need to know about.  The pharmacist who fills the prescription can provide more information and answer questions you may have about the medicine.  If you have questions or concerns that the pharmacist cannot address, please call or return to the Emergency Department.       Remember that you can always come back to the Emergency Department if you are not able to see your regular provider in the amount of time listed above, if you get any new symptoms, or if there is anything that worries you.

## 2024-08-28 ENCOUNTER — OFFICE VISIT (OUTPATIENT)
Dept: CARDIOLOGY | Facility: CLINIC | Age: 44
End: 2024-08-28
Payer: COMMERCIAL

## 2024-08-28 VITALS
DIASTOLIC BLOOD PRESSURE: 84 MMHG | OXYGEN SATURATION: 100 % | HEART RATE: 83 BPM | SYSTOLIC BLOOD PRESSURE: 119 MMHG | WEIGHT: 169.5 LBS | BODY MASS INDEX: 29.09 KG/M2

## 2024-08-28 DIAGNOSIS — R07.89 ATYPICAL CHEST PAIN: ICD-10-CM

## 2024-08-28 DIAGNOSIS — R00.2 PALPITATIONS: Primary | ICD-10-CM

## 2024-08-28 PROCEDURE — 99213 OFFICE O/P EST LOW 20 MIN: CPT | Mod: 25 | Performed by: INTERNAL MEDICINE

## 2024-08-28 PROCEDURE — 93246 EXT ECG>7D<15D RECORDING: CPT | Performed by: INTERNAL MEDICINE

## 2024-08-28 NOTE — PROGRESS NOTES
Cora Connell arrived here on 8/28/2024 12:22 PM for 8-14 Days  Zio monitor placement per ordering provider Dr. Neal for the diagnosis PVC.  Patient s skin was prepped per protocol. Dr. Neal is the supervising MD.  Zio monitor was placed.  Instructions were reviewed with and given to the patient.  Patient verbalized understanding of wear, troubleshooting and monitor return instructions.

## 2024-08-28 NOTE — PROGRESS NOTES
CARDIOLOGY CLINIC CONSULTATION    PRIMARY CARE PHYSICIAN:  Garry Katz    HISTORY OF PRESENT ILLNESS:  This is a very pleasant 43-year-old female who was seen by one of my colleagues in 2023.  She requested a provider change.    The patient was seen last year for palpitations.  Echocardiogram in 2023 showed a structurally normal heart.  However Holter monitor showed 2.5% PVC burden.  There was 1 episode of 10 beat of nonsustained atrial tachycardia.    The patient however has been having increasing intensity and frequency of palpitations.  Some of episodes sound arrhythmic and PVC like however some of them that happen when she is sleeping at night some more like normal sinus rhythm.  She has atypical cardiac symptoms in addition to that.    Recently however she went to the emergency department for chest discomfort and palpitations.  ECG during the ER visit is scanned in MUSE.  That shows sinus rhythm with very frequent PVCs including bigeminal rhythm.  PVCs seem to have a left bundle inferior axis with transition in V3.  ECG suggest that PVC appear to be RVOT origin very likely.    Lastly the patient has been complaining of intermittent lightheadedness and dizziness unclear if related to PVCs.    PAST MEDICAL HISTORY:  Past Medical History:   Diagnosis Date    Abnormal Pap smear 2000    LEEP    Depressive disorder     Postpartum depression     Varicosities     LE       MEDICATIONS:  Current Outpatient Medications   Medication Sig Dispense Refill    magnesium 250 MG tablet Take 1 tablet by mouth daily      vitamin C with B complex (B COMPLEX-C) tablet Take 1 tablet by mouth daily       No current facility-administered medications for this visit.       SOCIAL HISTORY:  I have reviewed this patient's social history and updated it with pertinent information if needed. Cora Connell  reports that she has never smoked. She has never used smokeless tobacco. She reports current alcohol use. She reports that she  does not use drugs.    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  Her recent ECG during the ER visit showed bigeminal rhythm.  Echocardiography in the past suggest structurally normal heart.  I recommend an exercise stress ECG and a 14-day ZIO monitor.  Depending on the results, I am quite inclined on starting her on low-dose of beta-blocker to see if this helps with her PVCs and symptom benefit.  Will follow-up with her after the results of these tests   Avoid stress dehydration lack of sleep caffeine intake.  Healthy diet exercise weight loss.    It was a pleasure seeing this patient in clinic today. Please do not hesitate to contact me with any future questions.     TRIXIE Lopez, [facility]  Cardiology - [facility]  August 28, 2024    Review of the result(s) of each unique test - Last ECG echocardiogram Holter monitor     The level of medical decision making during this visit was of moderate complexity.    This note was completed in part using dictation via the Dragon voice recognition software. Some word and grammatical errors may occur and must be interpreted in the appropriate clinical context.  If there are any questions pertaining to this issue, please contact me for further clarification.

## 2024-08-28 NOTE — LETTER
8/28/2024    Garry Katz MD  [address]  [place] [address]    RE: Cora Connell       Dear Colleague,     I had the pleasure of seeing Cora Connell in the [facility].  CARDIOLOGY CLINIC CONSULTATION    PRIMARY CARE PHYSICIAN:  Garry Katz    HISTORY OF PRESENT ILLNESS:  This is a very pleasant 43-year-old female who was seen by one of my colleagues in 2023.  She requested a provider change.    The patient was seen last year for palpitations.  Echocardiogram in 2023 showed a structurally normal heart.  However Holter monitor showed 2.5% PVC burden.  There was 1 episode of 10 beat of nonsustained atrial tachycardia.    The patient however has been having increasing intensity and frequency of palpitations.  Some of episodes sound arrhythmic and PVC like however some of them that happen when she is sleeping at night some more like normal sinus rhythm.  She has atypical cardiac symptoms in addition to that.    Recently however she went to the emergency department for chest discomfort and palpitations.  ECG during the ER visit is scanned in MUSE.  That shows sinus rhythm with very frequent PVCs including bigeminal rhythm.  PVCs seem to have a left bundle inferior axis with transition in V3.  ECG suggest that PVC appear to be RVOT origin very likely.    Lastly the patient has been complaining of intermittent lightheadedness and dizziness unclear if related to PVCs.    PAST MEDICAL HISTORY:  Past Medical History:   Diagnosis Date     Abnormal Pap smear 2000    LEEP     Depressive disorder      Postpartum depression      Varicosities     LE       MEDICATIONS:  Current Outpatient Medications   Medication Sig Dispense Refill     magnesium 250 MG tablet Take 1 tablet by mouth daily       vitamin C with B complex (B COMPLEX-C) tablet Take 1 tablet by mouth daily       No current facility-administered medications for this visit.       SOCIAL HISTORY:  I have reviewed this  patient's social history and updated it with pertinent information if needed. Cora Connell  reports that she has never smoked. She has never used smokeless tobacco. She reports current alcohol use. She reports that she does not use drugs.    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  Her recent ECG during the ER visit showed bigeminal rhythm.  Echocardiography in the past suggest structurally normal heart.  I recommend an exercise stress ECG and a 14-day ZIO monitor.  Depending on the results, I am quite inclined on starting her on low-dose of beta-blocker to see if this helps with her PVCs and symptom benefit.  Will follow-up with her after the results of these tests   Avoid stress dehydration lack of sleep caffeine intake.  Healthy diet exercise weight loss.    It was a pleasure seeing this patient in clinic today. Please do not hesitate to contact me with any future questions.     TRIXIE Lopez, [facility]  Cardiology - [facility]  August 28, 2024    Review of the result(s) of each unique test - Last ECG echocardiogram Holter monitor     The level of medical decision making during this visit was of moderate complexity.    This note was completed in part using dictation via the Dragon voice recognition software. Some word and grammatical errors may occur and must be interpreted in the appropriate clinical context.  If there are any  questions pertaining to this issue, please contact me for further clarification.      Thank you for allowing me to participate in the care of your patient.      Sincerely,     Kate Neal MD     [facility]  cc:   Garry Katz MD  [address] [address] [place] [id number]

## 2024-09-16 ENCOUNTER — CARE COORDINATION (OUTPATIENT)
Dept: CARDIOLOGY | Facility: CLINIC | Age: 44
End: 2024-09-16
Payer: COMMERCIAL

## 2024-09-16 DIAGNOSIS — R00.2 PALPITATIONS: Primary | ICD-10-CM

## 2024-09-16 DIAGNOSIS — I49.3 PVC'S (PREMATURE VENTRICULAR CONTRACTIONS): ICD-10-CM

## 2024-09-16 PROCEDURE — 93248 EXT ECG>7D<15D REV&INTERPJ: CPT | Performed by: INTERNAL MEDICINE

## 2024-09-16 NOTE — PROGRESS NOTES
Ziopatch results noted. Pt had 8/28 visit with  for increased palpitations, dizziness, and atypical chest pain. Stress ECG test and 14 day ziopatch were ordered. Pt did not schedule the stress ECG. Will update  on ziopatch results. Kj BRODERICK September 16, 2024, 3:18 PM      Conclusion    14-day cardiac monitor.  Frequent PVCs (~6%).  5 SVT runs, longest ~13 seconds.  Patient-triggered events correlated with frequent PVCs.

## 2024-09-17 NOTE — TELEPHONE ENCOUNTER
I would have her do the stress ECG, start Toprol XL 25 mg daily and follow up with me in 3 months with repeat holter. Hold BB for stress.

## 2024-09-18 RX ORDER — METOPROLOL SUCCINATE 25 MG/1
25 TABLET, EXTENDED RELEASE ORAL DAILY
Qty: 90 TABLET | Refills: 3 | Status: SHIPPED | OUTPATIENT
Start: 2024-09-18

## 2024-09-18 NOTE — PROGRESS NOTES
I called pt and reviewed 's recommendations. Pt will start metoprolol XL 25 mg daily. She will check BP and HR daily after starting to make sure she doesn't have issues with low BP. Pt did schedule her ECG stress test for October.    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 If she thinks it will be an issue setting up the holter she will call with an update and I can see if  can order a ziopatch instead. She will call scheduling to set up her 3 month holter and TEN visit. Kj BRODERICK September 18, 2024, 2:26 PM

## 2024-09-19 ENCOUNTER — MYC MEDICAL ADVICE (OUTPATIENT)
Dept: CARDIOLOGY | Facility: CLINIC | Age: 44
End: 2024-09-19
Payer: COMMERCIAL

## 2024-09-20 NOTE — TELEPHONE ENCOUNTER
Usually not significant, but there is concern other option would be to use low dose CCB like diltiazem 120 mg daily.

## 2024-09-24 NOTE — TELEPHONE ENCOUNTER
RN sent pt a [employer] message w/recommendations. Awaiting pt response. Hortensia Burton RN on 9/24/2024 at 12:48 PM      Kate Neal MD  You4 days ago   Usually not significant, but there is concern other option would be to use low dose CCB like diltiazem 120 mg daily.

## 2024-10-02 NOTE — TELEPHONE ENCOUNTER
I would have her get her stress test first before increasing the beta-blocker dose.    I would repeat a 48-hour Holter monitor to ensure that her palpitations after initiation of beta-blockers are truly related to arrhythmias before increasing the dose    If symptoms persist despite beta-blocker therapy from PVC burden, she will need a referral to EP

## 2024-10-09 ENCOUNTER — HOSPITAL ENCOUNTER (OUTPATIENT)
Dept: CARDIOLOGY | Facility: CLINIC | Age: 44
Discharge: HOME | End: 2024-10-09
Attending: INTERNAL MEDICINE | Admitting: INTERNAL MEDICINE
Payer: COMMERCIAL

## 2024-10-09 DIAGNOSIS — R00.2 PALPITATIONS: ICD-10-CM

## 2024-10-09 DIAGNOSIS — I49.3 PVC'S (PREMATURE VENTRICULAR CONTRACTIONS): ICD-10-CM

## 2024-10-09 PROCEDURE — 93227 XTRNL ECG REC<48 HR R&I: CPT | Performed by: INTERNAL MEDICINE

## 2024-10-09 PROCEDURE — 93225 XTRNL ECG REC<48 HRS REC: CPT

## 2024-10-14 ENCOUNTER — CARE COORDINATION (OUTPATIENT)
Dept: CARDIOLOGY | Facility: CLINIC | Age: 44
End: 2024-10-14
Payer: COMMERCIAL

## 2024-10-14 DIAGNOSIS — I49.3 PVC'S (PREMATURE VENTRICULAR CONTRACTIONS): ICD-10-CM

## 2024-10-14 DIAGNOSIS — R00.2 PALPITATIONS: ICD-10-CM

## 2024-10-14 NOTE — PROGRESS NOTES
48 hour holter results from 10/9/24 noted. Pt had 8/28 visit with  for palpitations and a ziopatch and exercise EKG stress test was ordered. Pt had ziopatch in August that showed 6 % PVC burden, and pt was started on metoprolol succinate 25 mg daily. Pt messaged initially that the metoprolol was helping, but then a couple of weeks ago she noticed increased palpitations again. Holter was recommended, along with EKG stress test that was recommended at the 8/28 visit. EKG stress test is 10/17. Will send  an update once all results are back. Kj BRODERICK October 14, 2024, 2:14 PM      Conclusion    1.Cora Connell was monitored for 48 hours. Quality of tracing was fair. The principal rhythm was Sinus rhythm. Average HR 79 bpm, maximum   bpm 8:17 PM on day 2, minimum HR 63 bpm 10:51 PM day 2. NM interval .13-.16 seconds, QRS duration .07 seconds, QT interval .31-.39  seconds.  2.There were 7,379 ventricular ectopics (3% burden) with 1 couplet.  3. 22 isolated SVE.  4.One Palpitation event market by patient, the rhythm was sinus at 75 bpm with associating bigeminal PVC.     Confirmed by ALEXANDREA CASTELLANOS ([id number]) on 10/11/2024 5:14:18 PM

## 2024-10-17 ENCOUNTER — HOSPITAL ENCOUNTER (OUTPATIENT)
Dept: CARDIOLOGY | Facility: CLINIC | Age: 44
Discharge: HOME | End: 2024-10-17
Attending: INTERNAL MEDICINE | Admitting: INTERNAL MEDICINE
Payer: COMMERCIAL

## 2024-10-17 DIAGNOSIS — R07.89 ATYPICAL CHEST PAIN: ICD-10-CM

## 2024-10-17 DIAGNOSIS — R00.2 PALPITATIONS: ICD-10-CM

## 2024-10-17 PROCEDURE — 93017 CV STRESS TEST TRACING ONLY: CPT

## 2024-10-17 PROCEDURE — 93018 CV STRESS TEST I&R ONLY: CPT | Performed by: INTERNAL MEDICINE

## 2024-10-17 PROCEDURE — 93016 CV STRESS TEST SUPVJ ONLY: CPT | Performed by: INTERNAL MEDICINE

## 2024-10-17 NOTE — PROGRESS NOTES
Stress test results noted. Will update  with holter and stress test results. Kj RN October 17, 2024, 1:05 PM    HR: 78  BP: 128/84 mmHg  ______________________________________________________________________________  Procedure  Treadmill stress test.  ______________________________________________________________________________  Interpretation Summary     This was a normal stress EKG with no evidence of stress-induced ischemia.  A high workload was achieved.  The patient exhibited no chest pain during exercise.  No arrhythmia noted.

## 2024-10-18 RX ORDER — METOPROLOL SUCCINATE 25 MG/1
25 TABLET, EXTENDED RELEASE ORAL 2 TIMES DAILY
Qty: 180 TABLET | Refills: 3 | Status: SHIPPED | OUTPATIENT
Start: 2024-10-18 | End: 2024-11-06

## 2024-10-18 NOTE — TELEPHONE ENCOUNTER
Yes please increase metoprolol succinate to 25 mg twice daily and have her follow-up with me in 1 to 2 months.  
,DirectAddress_Unknown

## 2024-10-30 ENCOUNTER — MYC MEDICAL ADVICE (OUTPATIENT)
Dept: CARDIOLOGY | Facility: CLINIC | Age: 44
End: 2024-10-30
Payer: COMMERCIAL

## 2024-10-30 DIAGNOSIS — I49.3 PVC'S (PREMATURE VENTRICULAR CONTRACTIONS): ICD-10-CM

## 2024-10-30 DIAGNOSIS — R00.2 PALPITATIONS: Primary | ICD-10-CM

## 2024-10-31 NOTE — TELEPHONE ENCOUNTER
previously said pt can see EP to discuss her PVCs if symptoms persist. 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 Pt would prefer to see an EP provider to discuss her PVCs. I will have scheduling call pt to set up an appt. Kj BRODERICK October 31, 2024, 8:46 AM

## 2024-11-04 ENCOUNTER — OFFICE VISIT (OUTPATIENT)
Dept: CARDIOLOGY | Facility: CLINIC | Age: 44
End: 2024-11-04
Attending: INTERNAL MEDICINE
Payer: COMMERCIAL

## 2024-11-04 VITALS
BODY MASS INDEX: 29.88 KG/M2 | WEIGHT: 175 LBS | SYSTOLIC BLOOD PRESSURE: 123 MMHG | DIASTOLIC BLOOD PRESSURE: 84 MMHG | HEART RATE: 70 BPM | HEIGHT: 64 IN | OXYGEN SATURATION: 99 %

## 2024-11-04 DIAGNOSIS — R00.2 PALPITATIONS: ICD-10-CM

## 2024-11-04 DIAGNOSIS — I49.3 PVC'S (PREMATURE VENTRICULAR CONTRACTIONS): Primary | ICD-10-CM

## 2024-11-04 DIAGNOSIS — R53.83 OTHER FATIGUE: ICD-10-CM

## 2024-11-04 PROCEDURE — 99205 OFFICE O/P NEW HI 60 MIN: CPT | Performed by: INTERNAL MEDICINE

## 2024-11-04 PROCEDURE — 93000 ELECTROCARDIOGRAM COMPLETE: CPT | Performed by: INTERNAL MEDICINE

## 2024-11-04 RX ORDER — DILTIAZEM HYDROCHLORIDE 120 MG/1
120 CAPSULE, EXTENDED RELEASE ORAL DAILY
Qty: 30 CAPSULE | Refills: 1 | Status: SHIPPED | OUTPATIENT
Start: 2024-11-04

## 2024-11-04 NOTE — LETTER
11/4/2024    Garry Katz MD  [address]  [facility] [address]    RE: Cora Connell       Dear Colleague,     I had the pleasure of seeing Cora Connell in the [facility].  [facility]  Cardiac Electrophysiology Clinic    Cora Connell MRN# [id number]   YOB: 1980 Age: 44 year old       I had the pleasure of seeing Cora Connell  who is a 44 year old female with history of palpitations and frequent PVCs.  She is followed by Dr. Neal in cardiology clinic and was most recently seen in 8/2024.  She reported feeling increasing intensity and frequency of palpitations.  She had a Holter monitor in 2023 which showed 2.5% PVC burden.  She had a 2-week Zio patch monitor done in 8/2024 which showed PVC burden of 6% as well as runs of SVT lasting up to 13 seconds.  She was started on metoprolol succinate 25 mg daily.  Her repeat Holter from 10/2024 showed 3% PVC burden.  Patient initially felt like the medication was helping, but then developed increasing palpitations again.  Dr. Neal recommended increasing her dose of metoprolol however the patient was hesitant to do so because she felt like the medication was making her feel more tired.      Today's Visit:  She first started having palpitations about 2 years ago.  She feels like the metoprolol is helping, but felt like the palpitations have increased in frequency again.  She has gained about 5 lbs and feels more tired since starting the medication.  She feels more PVC's after working out and has also noted that cheese can be a trigger.  She feels out of breath and sometimes feels a slow beat when she has the PVC's.  She has episodes of dizziness, but no syncope.   She is currently on metoprolol 25 mg daily.  She was worried she would feel more tired if she increased the dose.  She has 1 cup of coffee a day, has cut back significantly.  Occasional alcohol, which has not been a  trigger.  Takes several supplements/vitamins - mushroom, beet root, folic acid.  Folic acid helped with her symptoms.          Diagnostic Testing:  EKG today, which I overread, showed sinus rhythm rate 65 bpm.  , QRS 90, QTc 406 ms.  EKG 10/6/2023-sinus rhythm rate 69 bpm.  , QRS 88, QTc 385 ms.    Holter Monitor 10/2024 - personally reviewed  1.Cora Connell was monitored for 48 hours. Quality of tracing was fair. The principal rhythm was Sinus rhythm. Average HR 79 bpm, maximum   bpm 8:17 PM on day 2, minimum HR 63 bpm 10:51 PM day 2. MT interval .13-.16 seconds, QRS duration .07 seconds, QT interval .31-.39  seconds.  2.There were 7,379 ventricular ectopics (3% burden) with 1 couplet.  3. 22 isolated SVE.  4.One Palpitation event market by patient, the rhythm was sinus at 75 bpm with associating bigeminal PVC.    Holter Monitor 8/28/2024 - personally reviewed  14-day cardiac monitor.  Frequent PVCs (~6%).  5 SVT runs, longest ~13 seconds.  Patient-triggered events correlated with frequent PVCs.      Stress Testing 10/17/2024   This was a normal stress EKG with no evidence of stress-induced ischemia.  A high workload was achieved.  The patient exhibited no chest pain during exercise.  No arrhythmia noted.    Echocardiogram 10/24/2023    Left ventricular size, global systolic function, and wall motion are normal,  estimated LVEF 55-60%.  Right ventricular global function is normal.  No significant valvular abnormalities.  There are no prior studies available for comparison.      Last Comprehensive Metabolic Panel:  Lab Results   Component Value Date     08/22/2024    POTASSIUM 3.8 08/22/2024    CHLORIDE 100 08/22/2024    CO2 25 08/22/2024    ANIONGAP 12 08/22/2024    GLC 92 08/22/2024    BUN 10.9 08/22/2024    CR 0.69 08/22/2024    GFRESTIMATED >90 08/22/2024    RACIEL 9.8 08/22/2024        Magnesium   Date Value Ref Range Status   08/22/2024 1.9 1.7 - 2.3 mg/dL Final        TSH   Date  Value Ref Range Status   08/22/2024 1.76 0.30 - 4.20 uIU/mL Final          Assessment/Plan:    44 year old palpitations and frequent PVCs.  She had a normal echocardiogram and stress test.  She had a Holter monitor in 2023 which showed 2.5% PVC burden.    She noted increasing symptoms this past year and had a 2-week Zio patch monitor done in 8/2024 which showed PVC burden of 6% as well as runs of SVT lasting up to 13 seconds.  She was started on metoprolol succinate 25 mg daily.  Her repeat Holter from 10/2024 showed 3% PVC burden.  EKG today shows sinus rhythm with normal intervals.  We discussed the diagnosis of premature ventricular contractions and treatment options including medications and ablation.  She has noted improvement in symptoms since starting metoprolol, but unfortunately has also experienced side effects of fatigue and weight gain.  We discussed the ablation procedure in detail including risks and benefits.  She is interested in ablation but wanted a little bit more time to think about it.  In the meantime we will try switching her from metoprolol to diltiazem.    Start diltiazem 120 mg daily  Stop metoprolol  Will call next week to discuss scheduling ablation  Schedule with MAC for sedation  Stop diltiazem (or metoprolol) 2 days prior      Yosvany Mata MD        Orders this Visit:  Orders Placed This Encounter   Procedures     EKG 12-lead complete w/read - Clinics (performed today)     Orders Placed This Encounter   Medications     diltiazem ER (DILT-XR) 120 MG 24 hr capsule     Sig: Take 1 capsule (120 mg) by mouth daily.     Dispense:  30 capsule     Refill:  1     Medications Discontinued During This Encounter   Medication Reason     metoprolol succinate ER (TOPROL XL) 25 MG 24 hr tablet        Encounter Diagnoses   Name Primary?     Palpitations      PVC's (premature ventricular contractions) Yes     Other fatigue      Today's clinic visit entailed:  Review of the result(s) of each unique test -  "echo, stress test  The following tests were independently interpreted by me as noted in my documentation: EKG, Holter  65 minutes spent by me on the date of the encounter doing chart review, history and exam, documentation and further activities per the note    CURRENT MEDICATIONS:  Current Outpatient Medications   Medication Sig Dispense Refill     magnesium 250 MG tablet Take 1 tablet by mouth daily       metoprolol succinate ER (TOPROL XL) 25 MG 24 hr tablet Take 1 tablet (25 mg) by mouth 2 times daily. 180 tablet 3     vitamin C with B complex (B COMPLEX-C) tablet Take 1 tablet by mouth daily         Review of Systems:  Pertinent review of system as stated above in HPI    Skin:  Negative     Eyes:  Negative    ENT:  Negative    Respiratory:  Positive for sleep apnea  Cardiovascular:    Positive for;palpitations  Gastroenterology: Negative    Genitourinary:  Negative    Musculoskeletal:  Negative    Neurologic:  Negative    Psychiatric:  Negative    Heme/Lymph/Imm:  Negative    Endocrine:  Negative      Physical Exam:  Vitals: /84   Pulse 70   Ht 1.626 m (5' 4\")   Wt 79.4 kg (175 lb)   SpO2 99%   BMI 30.04 kg/m      Constitutional: Pleasant, no apparent distress.  Respiratory: Breathing non-labored.   Cardiovascular:  Regular rate and rhythm  Neurologic: No gross motor deficits.   Psychiatric: Affect appropriate.        ALLERGIES  Allergies   Allergen Reactions     Ancef [Cefazolin Sodium]      No redness or hives or swelling noted with administration.  Tolerated without issue 2/9/18.       PAST MEDICAL HISTORY:  Past Medical History:   Diagnosis Date     Abnormal Pap smear 2000    LEEP     Depressive disorder      Postpartum depression      Varicosities     LE       PAST SURGICAL HISTORY:  Past Surgical History:   Procedure Laterality Date     EXCISE LESION UPPER EXTREMITY  1/2/2014    Procedure: EXCISE LESION UPPER EXTREMITY;  EXCISION OF LEFT SHOULDER MASS ;  Surgeon: Nena Zavaleta MD;  Location: " [facility]     HC REMOVAL OF TONSILS,<11 Y/O      5 years of age     LAPAROSCOPIC CYSTECTOMY OVARIAN (BENIGN) Right 2/9/2018    Procedure: LAPAROSCOPIC CYSTECTOMY OVARIAN (BENIGN);;  Surgeon: Latasha Arceo MD;  Location: [facility] OR     LAPAROSCOPY DIAGNOSTIC (GYN) Right 2/9/2018    Procedure: LAPAROSCOPY DIAGNOSTIC (GYN);  DIAGNOSTIC LAPAROSCOPY, RIGHT OVARIAN CYSTECTOMY.;  Surgeon: Latasha Arceo MD;  Location:  OR     LEEP TX, CERVICAL         FAMILY HISTORY:  Family History   Problem Relation Age of Onset     Coronary Artery Disease Father         CABG at age 68 years, smoker.       SOCIAL HISTORY:  Social History     Socioeconomic History     Marital status:      Spouse name: None     Number of children: None     Years of education: None     Highest education level: None   Tobacco Use     Smoking status: Never     Smokeless tobacco: Never   Substance and Sexual Activity     Alcohol use: Yes     Comment: occasional.     Drug use: Never     Sexual activity: Yes     Partners: Male             CC  Kate Neal MD  [address]  [place]  [place] [address]      Thank you for allowing me to participate in the care of your patient.      Sincerely,     Yosvany Mata MD     [facility]  cc:   Kate Neal MD  [address]  [place] [id number]

## 2024-11-04 NOTE — PATIENT INSTRUCTIONS
Today's Recommendations    Start diltiazem 120 mg daily   Stop metoprolol  We will call you to schedule the ablation    Please send [employer] message or call [phone] for the EP nurses with questions or concerns.     Scheduling and after hours number [phone]

## 2024-11-04 NOTE — PROGRESS NOTES
[facility] HEART CLINIC  Cardiac Electrophysiology Clinic    Cora Connell MRN# [id number]   YOB: 1980 Age: 44 year old       I had the pleasure of seeing Cora Connell  who is a 44 year old female with history of palpitations and frequent PVCs.  She is followed by Dr. Neal in cardiology clinic and was most recently seen in 8/2024.  She reported feeling increasing intensity and frequency of palpitations.  She had a Holter monitor in 2023 which showed 2.5% PVC burden.  She had a 2-week Zio patch monitor done in 8/2024 which showed PVC burden of 6% as well as runs of SVT lasting up to 13 seconds.  She was started on metoprolol succinate 25 mg daily.  Her repeat Holter from 10/2024 showed 3% PVC burden.  Patient initially felt like the medication was helping, but then developed increasing palpitations again.  Dr. Neal recommended increasing her dose of metoprolol however the patient was hesitant to do so because she felt like the medication was making her feel more tired.      Today's Visit:  She first started having palpitations about 2 years ago.  She feels like the metoprolol is helping, but felt like the palpitations have increased in frequency again.  She has gained about 5 lbs and feels more tired since starting the medication.  She feels more PVC's after working out and has also noted that cheese can be a trigger.  She feels out of breath and sometimes feels a slow beat when she has the PVC's.  She has episodes of dizziness, but no syncope.   She is currently on metoprolol 25 mg daily.  She was worried she would feel more tired if she increased the dose.  She has 1 cup of coffee a day, has cut back significantly.  Occasional alcohol, which has not been a trigger.  Takes several supplements/vitamins - mushroom, beet root, folic acid.  Folic acid helped with her symptoms.          Diagnostic Testing:  EKG today, which I overread, showed sinus rhythm rate 65 bpm.  , QRS 90,  QTc 406 ms.  EKG 10/6/2023-sinus rhythm rate 69 bpm.  , QRS 88, QTc 385 ms.    Holter Monitor 10/2024 - personally reviewed  1.Cora Connell was monitored for 48 hours. Quality of tracing was fair. The principal rhythm was Sinus rhythm. Average HR 79 bpm, maximum   bpm 8:17 PM on day 2, minimum HR 63 bpm 10:51 PM day 2. IA interval .13-.16 seconds, QRS duration .07 seconds, QT interval .31-.39  seconds.  2.There were 7,379 ventricular ectopics (3% burden) with 1 couplet.  3. 22 isolated SVE.  4.One Palpitation event market by patient, the rhythm was sinus at 75 bpm with associating bigeminal PVC.    Holter Monitor 8/28/2024 - personally reviewed  14-day cardiac monitor.  Frequent PVCs (~6%).  5 SVT runs, longest ~13 seconds.  Patient-triggered events correlated with frequent PVCs.      Stress Testing 10/17/2024   This was a normal stress EKG with no evidence of stress-induced ischemia.  A high workload was achieved.  The patient exhibited no chest pain during exercise.  No arrhythmia noted.    Echocardiogram 10/24/2023    Left ventricular size, global systolic function, and wall motion are normal,  estimated LVEF 55-60%.  Right ventricular global function is normal.  No significant valvular abnormalities.  There are no prior studies available for comparison.      Last Comprehensive Metabolic Panel:  Lab Results   Component Value Date     08/22/2024    POTASSIUM 3.8 08/22/2024    CHLORIDE 100 08/22/2024    CO2 25 08/22/2024    ANIONGAP 12 08/22/2024    GLC 92 08/22/2024    BUN 10.9 08/22/2024    CR 0.69 08/22/2024    GFRESTIMATED >90 08/22/2024    RACIEL 9.8 08/22/2024        Magnesium   Date Value Ref Range Status   08/22/2024 1.9 1.7 - 2.3 mg/dL Final        TSH   Date Value Ref Range Status   08/22/2024 1.76 0.30 - 4.20 uIU/mL Final          Assessment/Plan:    44 year old palpitations and frequent PVCs.  She had a normal echocardiogram and stress test.  She had a Holter monitor in 2023 which  showed 2.5% PVC burden.    She noted increasing symptoms this past year and had a 2-week Zio patch monitor done in 8/2024 which showed PVC burden of 6% as well as runs of SVT lasting up to 13 seconds.  She was started on metoprolol succinate 25 mg daily.  Her repeat Holter from 10/2024 showed 3% PVC burden.  EKG today shows sinus rhythm with normal intervals.  We discussed the diagnosis of premature ventricular contractions and treatment options including medications and ablation.  She has noted improvement in symptoms since starting metoprolol, but unfortunately has also experienced side effects of fatigue and weight gain.  We discussed the ablation procedure in detail including risks and benefits.  She is interested in ablation but wanted a little bit more time to think about it.  In the meantime we will try switching her from metoprolol to diltiazem.    Start diltiazem 120 mg daily  Stop metoprolol  Will call next week to discuss scheduling ablation  Schedule with MAC for sedation  Stop diltiazem (or metoprolol) 2 days prior      Yosvany Mata MD        Orders this Visit:  Orders Placed This Encounter   Procedures    EKG 12-lead complete w/read - Clinics (performed today)     Orders Placed This Encounter   Medications    diltiazem ER (DILT-XR) 120 MG 24 hr capsule     Sig: Take 1 capsule (120 mg) by mouth daily.     Dispense:  30 capsule     Refill:  1     Medications Discontinued During This Encounter   Medication Reason    metoprolol succinate ER (TOPROL XL) 25 MG 24 hr tablet        Encounter Diagnoses   Name Primary?    Palpitations     PVC's (premature ventricular contractions) Yes    Other fatigue      Today's clinic visit entailed:  Review of the result(s) of each unique test - echo, stress test  The following tests were independently interpreted by me as noted in my documentation: EKG, Holter  65 minutes spent by me on the date of the encounter doing chart review, history and exam, documentation and further  "activities per the note    CURRENT MEDICATIONS:  Current Outpatient Medications   Medication Sig Dispense Refill    magnesium 250 MG tablet Take 1 tablet by mouth daily      metoprolol succinate ER (TOPROL XL) 25 MG 24 hr tablet Take 1 tablet (25 mg) by mouth 2 times daily. 180 tablet 3    vitamin C with B complex (B COMPLEX-C) tablet Take 1 tablet by mouth daily         Review of Systems:  Pertinent review of system as stated above in HPI    Skin:  Negative     Eyes:  Negative    ENT:  Negative    Respiratory:  Positive for sleep apnea  Cardiovascular:    Positive for;palpitations  Gastroenterology: Negative    Genitourinary:  Negative    Musculoskeletal:  Negative    Neurologic:  Negative    Psychiatric:  Negative    Heme/Lymph/Imm:  Negative    Endocrine:  Negative      Physical Exam:  Vitals: /84   Pulse 70   Ht 1.626 m (5' 4\")   Wt 79.4 kg (175 lb)   SpO2 99%   BMI 30.04 kg/m      Constitutional: Pleasant, no apparent distress.  Respiratory: Breathing non-labored.   Cardiovascular:  Regular rate and rhythm  Neurologic: No gross motor deficits.   Psychiatric: Affect appropriate.        ALLERGIES  Allergies   Allergen Reactions    Ancef [Cefazolin Sodium]      No redness or hives or swelling noted with administration.  Tolerated without issue 2/9/18.       PAST MEDICAL HISTORY:  Past Medical History:   Diagnosis Date    Abnormal Pap smear 2000    LEEP    Depressive disorder     Postpartum depression     Varicosities     LE       PAST SURGICAL HISTORY:  Past Surgical History:   Procedure Laterality Date    EXCISE LESION UPPER EXTREMITY  1/2/2014    Procedure: EXCISE LESION UPPER EXTREMITY;  EXCISION OF LEFT SHOULDER MASS ;  Surgeon: Nena Zavaleta MD;  Location: [facility] REMOVAL OF TONSILS,<11 Y/O      5 years of age    LAPAROSCOPIC CYSTECTOMY OVARIAN (BENIGN) Right 2/9/2018    Procedure: LAPAROSCOPIC CYSTECTOMY OVARIAN (BENIGN);;  Surgeon: Latasha Arceo MD;  Location:  OR    LAPAROSCOPY " DIAGNOSTIC (GYN) Right 2/9/2018    Procedure: LAPAROSCOPY DIAGNOSTIC (GYN);  DIAGNOSTIC LAPAROSCOPY, RIGHT OVARIAN CYSTECTOMY.;  Surgeon: Latasha Arceo MD;  Location:  OR    LEEP TX, CERVICAL         FAMILY HISTORY:  Family History   Problem Relation Age of Onset    Coronary Artery Disease Father         CABG at age 68 years, smoker.       SOCIAL HISTORY:  Social History     Socioeconomic History    Marital status:      Spouse name: None    Number of children: None    Years of education: None    Highest education level: None   Tobacco Use    Smoking status: Never    Smokeless tobacco: Never   Substance and Sexual Activity    Alcohol use: Yes     Comment: occasional.    Drug use: Never    Sexual activity: Yes     Partners: Male             CC  Kate Neal MD  [address]  [place] [id number]

## 2024-11-11 ENCOUNTER — TELEPHONE (OUTPATIENT)
Dept: CARDIOLOGY | Facility: CLINIC | Age: 44
End: 2024-11-11
Payer: COMMERCIAL

## 2024-11-11 DIAGNOSIS — I49.3 PVC'S (PREMATURE VENTRICULAR CONTRACTIONS): Primary | ICD-10-CM

## 2024-12-20 ENCOUNTER — MYC MEDICAL ADVICE (OUTPATIENT)
Dept: CARDIOLOGY | Facility: CLINIC | Age: 44
End: 2024-12-20
Payer: COMMERCIAL

## 2024-12-20 NOTE — PROGRESS NOTES
Cora Connell is a 44 year old female who is being evaluated via a billable video visit.      CC:  PVCs      BRIEF HPI:  Cora is a 44 year old yo woman who sees Dr. Mata and Dr. Neal for h/o:    1.  PVCs -palpitations starting ~2022.  Increasing burden, up to 6% 8/2024, with side effects from BB.  Switched to CCB and now set up for PVC ablation with Dr. Mata        LAST VISIT & INTERVAL HISTORY:  Dr. Mata met her 11/2024 at which time they reviewed palpitations starting ~2022.  Holter monitor in 2023 showed 2.5% burden.  She felt increased PERSON with some episodes of lightheadedness and more frequent palpitations and updated ZioPatch 8/2024 had shown 6% PVC burden and short runs of SVT.  She was symptomatic from a PVC perspective and was started on metoprolol, noting fatigue and weight gain.  Dr. Mata stopped metoprolol and started Diltiazem 120 mg daily with plans for consideration of PVC ablation with MAC, holding CCB//BB 2 days prior.    Her ablation has been set up for 12/27/2024.    She contacted us a few days ago to report she'd had a procedure for sleep apnea done. She had some mild epistaxis following.    TODAY'S VISIT:  Cora notes Diltiazem seems to be working a little better than metoprolol but still has issues with recurrent palpitations. Side effects such as fatigue are much improved off BB, however.    No syncope or severe presyncope. Denies dizziness, lightheadedness. No CP, SOB.     She just had turbinate reduction and is already breathing/feeling/sleeping better. She was placed on prednisone 10 mg (30 mg x 2 days, 20 mg x 2 days, 10 mg x 2 days, then 5 mg x 2 days, then off).  Currently on first day of 20 mg daily. Notes minimal swelling with this and has not had epistaxis    DIAGNOSTICS:  48h Holter 10/2024 SR with PVCs. In SR, avg HR 79 bpm (63-111bpm) 3% PVC burden, <,1% PACs.  Event marked c/w SR 75 and bigeminal PVC  Stress Test 10/2024 -normal stress EKG  ZioPatch 8/2024 SR with  frequent PVCs and SVT.   6% PVC burden.  5 runs of SVT, longest lasting 13s.  Symptoms a/w frequent PVCs  Echocardiogram 10/2023-EF 55 to 60%      REVIEW OF SYSTEMS:  Negative with the exception of that noted above    PHYSICAL EXAM:  There were no vitals taken for this visit.  There is no height or weight on file to calculate BMI.    Physical Exam  GENERAL: alert and no distress  EYES: Eyes grossly normal to inspection.  No discharge or erythema, or obvious scleral/conjunctival abnormalities.  RESP: No audible wheeze, cough, or visible cyanosis.    SKIN: Visible skin clear. No significant rash, abnormal pigmentation or lesions.  NEURO: Cranial nerves grossly intact.  Mentation and speech appropriate for age.  PSYCH: Appropriate affect, tone, and pace of words      PLAN:  PVC ablation 12/27  Follow-up as arranged    ASSESSMENT/PLAN:    1.  PVCs  Noted increasing symptoms, with side effects from metoprolol  After switching to Diltiazem, she's feeling better from side effect perspective but still noting palpitaitons     PLAN:  PVC ablation with MAC. We discussed the risks, benefits and indications of proceeding with electrophysiology study and possible ablation of PVCs,  including but not limited to the use of anesthesia and need for a  upon discharge, peripheral vessel injury and discomfort, heart attack, death, stroke, cardiac puncture and/or tamponade, pneumothorax, olvda-[id number]-cyzka-arrhythmias requiring further treatment and damage to existing electrical structures that may require permanent pacemaker implantation. We reviewed that additional procedures may be required. We briefly discussed post-procedural restrictions and post-procedural discomfort. The patient voiced understanding and is willing to proceed. A consent form will be signed by the procedural physician.  Arrive 0630 (patient registration, first floor [facility]) 12/27/2024  No solid food after 10:30 PM 12/26. Can have clear  liquids up until 4:30 AM 12/27, then nothing to eat/drink until after procedure  HOLD Diltiazem starting 12/24.  Last dose 12/24  Hold all medication/supplement morning of procedure including prednisone, as will be on an empty stomach  Home same day with , and no driving for at least 48 hours given sedation  Also mentions that if jewelry is removed for procedure she's been unsuccessful in getting her wedding ring off her L hand. She does not think this should be tried for procedure.    Follow-up has been arranged      CURRENT MEDICATIONS:  Current Outpatient Medications   Medication Sig Dispense Refill    Cholecalciferol (VITAMIN D-3 PO) Take by mouth every 7 days. Unsure dosage, otc      diltiazem ER (DILT-XR) 120 MG 24 hr capsule Take 1 capsule (120 mg) by mouth daily. 30 capsule 1    ELDERBERRY PO Take by mouth daily.      magnesium 250 MG tablet Take 1 tablet by mouth daily      predniSONE (DELTASONE) 10 MG tablet Take 3 tablets (30 mg) by mouth daily, THEN 2 tablets (20 mg) daily, THEN 1 tablet (10 mg) daily, THEN 0.5 tablets (5 mg) daily.      vitamin C with B complex (B COMPLEX-C) tablet Take 1 tablet by mouth daily           ORDERS PLACED:  No orders of the defined types were placed in this encounter.    Orders Placed This Encounter   Medications    ELDERBERRY PO     Sig: Take by mouth daily.    Cholecalciferol (VITAMIN D-3 PO)     Sig: Take by mouth every 7 days. Unsure dosage, otc    predniSONE (DELTASONE) 10 MG tablet     Sig: Take 3 tablets (30 mg) by mouth daily, THEN 2 tablets (20 mg) daily, THEN 1 tablet (10 mg) daily, THEN 0.5 tablets (5 mg) daily.     There are no discontinued medications.      Encounter Diagnoses   Name Primary?    Palpitations     PVC's (premature ventricular contractions)          ALLERGIES     Allergies   Allergen Reactions    Ancef [Cefazolin Sodium]      No redness or hives or swelling noted with administration.  Tolerated without issue 2/9/18.       PAST MEDICAL  HISTORY:  Past Medical History:   Diagnosis Date    Abnormal Pap smear 2000    LEEP    Depressive disorder     Postpartum depression     Varicosities     LE       PAST SURGICAL HISTORY:  Past Surgical History:   Procedure Laterality Date    EXCISE LESION UPPER EXTREMITY  1/2/2014    Procedure: EXCISE LESION UPPER EXTREMITY;  EXCISION OF LEFT SHOULDER MASS ;  Surgeon: Nena Zavaleta MD;  Location: [facility] REMOVAL OF TONSILS,<11 Y/O      5 years of age    LAPAROSCOPIC CYSTECTOMY OVARIAN (BENIGN) Right 2/9/2018    Procedure: LAPAROSCOPIC CYSTECTOMY OVARIAN (BENIGN);;  Surgeon: Latasha Arceo MD;  Location:  OR    LAPAROSCOPY DIAGNOSTIC (GYN) Right 2/9/2018    Procedure: LAPAROSCOPY DIAGNOSTIC (GYN);  DIAGNOSTIC LAPAROSCOPY, RIGHT OVARIAN CYSTECTOMY.;  Surgeon: Latasha Arceo MD;  Location:  OR    LEEP TX, CERVICAL         FAMILY HISTORY:  Family History   Problem Relation Age of Onset    Coronary Artery Disease Father         CABG at age 68 years, smoker.       SOCIAL HISTORY:  Social History     Socioeconomic History    Marital status:      Spouse name: None    Number of children: None    Years of education: None    Highest education level: None   Tobacco Use    Smoking status: Never    Smokeless tobacco: Never   Substance and Sexual Activity    Alcohol use: Yes     Comment: occasional.    Drug use: Never    Sexual activity: Yes     Partners: Male       Video-Visit Details    Type of service:  Video Visit    Video Start Time: 1437  Video End Time:  63587  Duration: 16 minutes    Originating Location (pt. Location): Home    Distant Location (provider location):  [facility]     Platform used for Video Visit: ALISHA Benavides

## 2024-12-20 NOTE — TELEPHONE ENCOUNTER
12/20/24 Pt called to report that last week she had a procedure  where she underwent a mild scraping of her sinuses due to mild sleep apnea. She was not put under and was not given any new medications. She has been experiencing some mild nosebleeds and went for follow up visit today. Her MD was going to prescribe prednisone to help w swelling but she declined.   She was asking if this in any way would be a problem with her PVC Ablation scheduled for 12/27. Discussed that it should not, and advised her to watch for any signs of infection ( fever > 100, abnormal drainage or pain) and to let us know if this occurs  Pt had a few other questions relating to procedures regarding sedation and med holds which I reviewed . Reminded her of the 2 day Diltiazem hold pre procedure  Reminded her we will call her 12/26 to go over everything   KHerroRN 145 pm

## 2024-12-23 ENCOUNTER — VIRTUAL VISIT (OUTPATIENT)
Dept: CARDIOLOGY | Facility: CLINIC | Age: 44
End: 2024-12-23
Payer: COMMERCIAL

## 2024-12-23 DIAGNOSIS — I49.3 PVC'S (PREMATURE VENTRICULAR CONTRACTIONS): ICD-10-CM

## 2024-12-23 DIAGNOSIS — R00.2 PALPITATIONS: ICD-10-CM

## 2024-12-23 PROCEDURE — 99214 OFFICE O/P EST MOD 30 MIN: CPT | Mod: 95 | Performed by: PHYSICIAN ASSISTANT

## 2024-12-23 RX ORDER — PREDNISONE 10 MG/1
TABLET ORAL
COMMUNITY
Start: 2024-12-23

## 2024-12-23 NOTE — PATIENT INSTRUCTIONS
Cora - it was nice to speak with you today!    Today we reviewed:    Upcoming PVC ablation 12/27  Still noticing palpitations/PVCs, but side effects better after switch to Diltiazem      MEDICATION CHANGES:    NONE    RECOMMENDATIONS:    PVC ablation Friday 12/27/2024   Arrive 6:30 AM (patient registration, first floor [facility]) 12/27/2024  No solid food after 10:30 PM 12/26. Can have clear liquids up until 4:30 AM 12/27, then nothing to eat/drink until after procedure  HOLD Diltiazem after 12/24.  Last dose 12/24  Hold all medication/supplement morning of procedure including prednisone, as will be on an empty stomach  Home same day with , and no driving for at least 48 hours given sedation    TAKE IT EASY for at least a few days after procedure to protect groin site    FOLLOW UP:    As arranged 1/27/2025    IMPORTANT PHONE NUMBERS FOR [facility] ([place]):  Arrhythmia nurses Beena Ernandez & Keisha: [phone]

## 2024-12-23 NOTE — PROGRESS NOTES
"Cora is a 44 year old who is being evaluated via a billable video visit.    How would you like to obtain your AVS? MyChart  If the video visit is dropped, the invitation should be resent by: Text to cell phone: [phone]  Will anyone else be joining your video visit? No    Video-Visit Details    Type of service:  Video Visit   Originating Location (pt. Location): Home    Distant Location (provider location):  On-site  Platform used for Video Visit: FinaModbook    Vitals - Patient Reported  Systolic (Patient Reported): 133  Diastolic (Patient Reported): 84  Height (Patient Reported): 162.6 cm (5' 4\")      Vickie Carreon CMA    "

## 2024-12-23 NOTE — LETTER
12/23/2024    Garry Katz MD  [address]  [facility] [address]    RE: Cora Marleygomery       Dear Colleague,     I had the pleasure of seeing Cora Connell in the [facility].  Cora Connell is a 44 year old female who is being evaluated via a billable video visit.      CC:  PVCs      BRIEF HPI:  Cora is a 44 year old yo woman who sees Dr. Mata and Dr. Neal for h/o:    1.  PVCs -palpitations starting ~2022.  Increasing burden, up to 6% 8/2024, with side effects from BB.  Switched to CCB and now set up for PVC ablation with Dr. Mata        LAST VISIT & INTERVAL HISTORY:  Dr. Mata met her 11/2024 at which time they reviewed palpitations starting ~2022.  Holter monitor in 2023 showed 2.5% burden.  She felt increased PERSON with some episodes of lightheadedness and more frequent palpitations and updated ZioPatch 8/2024 had shown 6% PVC burden and short runs of SVT.  She was symptomatic from a PVC perspective and was started on metoprolol, noting fatigue and weight gain.  Dr. Mata stopped metoprolol and started Diltiazem 120 mg daily with plans for consideration of PVC ablation with MAC, holding CCB//BB 2 days prior.    Her ablation has been set up for 12/27/2024.    She contacted us a few days ago to report she'd had a procedure for sleep apnea done. She had some mild epistaxis following.    TODAY'S VISIT:  Cora notes Diltiazem seems to be working a little better than metoprolol but still has issues with recurrent palpitations. Side effects such as fatigue are much improved off BB, however.    No syncope or severe presyncope. Denies dizziness, lightheadedness. No CP, SOB.     She just had turbinate reduction and is already breathing/feeling/sleeping better. She was placed on prednisone 10 mg (30 mg x 2 days, 20 mg x 2 days, 10 mg x 2 days, then 5 mg x 2 days, then off).  Currently on first day of 20 mg daily. Notes minimal swelling with this and has not had  epistaxis    DIAGNOSTICS:  48h Holter 10/2024 SR with PVCs. In SR, avg HR 79 bpm (63-111bpm) 3% PVC burden, <,1% PACs.  Event marked c/w SR 75 and bigeminal PVC  Stress Test 10/2024 -normal stress EKG  ZioPatch 8/2024 SR with frequent PVCs and SVT.   6% PVC burden.  5 runs of SVT, longest lasting 13s.  Symptoms a/w frequent PVCs  Echocardiogram 10/2023-EF 55 to 60%      REVIEW OF SYSTEMS:  Negative with the exception of that noted above    PHYSICAL EXAM:  There were no vitals taken for this visit.  There is no height or weight on file to calculate BMI.    Physical Exam  GENERAL: alert and no distress  EYES: Eyes grossly normal to inspection.  No discharge or erythema, or obvious scleral/conjunctival abnormalities.  RESP: No audible wheeze, cough, or visible cyanosis.    SKIN: Visible skin clear. No significant rash, abnormal pigmentation or lesions.  NEURO: Cranial nerves grossly intact.  Mentation and speech appropriate for age.  PSYCH: Appropriate affect, tone, and pace of words      PLAN:  PVC ablation 12/27  Follow-up as arranged    ASSESSMENT/PLAN:    1.  PVCs  Noted increasing symptoms, with side effects from metoprolol  After switching to Diltiazem, she's feeling better from side effect perspective but still noting palpitaitons     PLAN:  PVC ablation with MAC. We discussed the risks, benefits and indications of proceeding with electrophysiology study and possible ablation of PVCs,  including but not limited to the use of anesthesia and need for a  upon discharge, peripheral vessel injury and discomfort, heart attack, death, stroke, cardiac puncture and/or tamponade, pneumothorax, gyeyi-dg-gucoz-arrhythmias requiring further treatment and damage to existing electrical structures that may require permanent pacemaker implantation. We reviewed that additional procedures may be required. We briefly discussed post-procedural restrictions and post-procedural discomfort. The patient voiced understanding and is  willing to proceed. A consent form will be signed by the procedural physician.  Arrive 0630 (patient registration, first floor [facility]) 12/27/2024  No solid food after 10:30 PM 12/26. Can have clear liquids up until 4:30 AM 12/27, then nothing to eat/drink until after procedure  HOLD Diltiazem starting 12/24.  Last dose 12/24  Hold all medication/supplement morning of procedure including prednisone, as will be on an empty stomach  Home same day with , and no driving for at least 48 hours given sedation    Follow-up has been arranged      CURRENT MEDICATIONS:  Current Outpatient Medications   Medication Sig Dispense Refill     Cholecalciferol (VITAMIN D-3 PO) Take by mouth every 7 days. Unsure dosage, otc       diltiazem ER (DILT-XR) 120 MG 24 hr capsule Take 1 capsule (120 mg) by mouth daily. 30 capsule 1     ELDERBERRY PO Take by mouth daily.       magnesium 250 MG tablet Take 1 tablet by mouth daily       predniSONE (DELTASONE) 10 MG tablet Take 3 tablets (30 mg) by mouth daily, THEN 2 tablets (20 mg) daily, THEN 1 tablet (10 mg) daily, THEN 0.5 tablets (5 mg) daily.       vitamin C with B complex (B COMPLEX-C) tablet Take 1 tablet by mouth daily           ORDERS PLACED:  No orders of the defined types were placed in this encounter.    Orders Placed This Encounter   Medications     ELDERBERRY PO     Sig: Take by mouth daily.     Cholecalciferol (VITAMIN D-3 PO)     Sig: Take by mouth every 7 days. Unsure dosage, otc     predniSONE (DELTASONE) 10 MG tablet     Sig: Take 3 tablets (30 mg) by mouth daily, THEN 2 tablets (20 mg) daily, THEN 1 tablet (10 mg) daily, THEN 0.5 tablets (5 mg) daily.     There are no discontinued medications.      Encounter Diagnoses   Name Primary?     Palpitations      PVC's (premature ventricular contractions)          ALLERGIES     Allergies   Allergen Reactions     Ancef [Cefazolin Sodium]      No redness or hives or swelling noted with administration.   Tolerated without issue 2/9/18.       PAST MEDICAL HISTORY:  Past Medical History:   Diagnosis Date     Abnormal Pap smear 2000    LEEP     Depressive disorder      Postpartum depression      Varicosities     LE       PAST SURGICAL HISTORY:  Past Surgical History:   Procedure Laterality Date     EXCISE LESION UPPER EXTREMITY  1/2/2014    Procedure: EXCISE LESION UPPER EXTREMITY;  EXCISION OF LEFT SHOULDER MASS ;  Surgeon: Nena Zavaleta MD;  Location: [facility] REMOVAL OF TONSILS,<11 Y/O      5 years of age     LAPAROSCOPIC CYSTECTOMY OVARIAN (BENIGN) Right 2/9/2018    Procedure: LAPAROSCOPIC CYSTECTOMY OVARIAN (BENIGN);;  Surgeon: Latasha Arceo MD;  Location:  OR     LAPAROSCOPY DIAGNOSTIC (GYN) Right 2/9/2018    Procedure: LAPAROSCOPY DIAGNOSTIC (GYN);  DIAGNOSTIC LAPAROSCOPY, RIGHT OVARIAN CYSTECTOMY.;  Surgeon: Latasha Arceo MD;  Location:  OR     LEEP TX, CERVICAL         FAMILY HISTORY:  Family History   Problem Relation Age of Onset     Coronary Artery Disease Father         CABG at age 68 years, smoker.       SOCIAL HISTORY:  Social History     Socioeconomic History     Marital status:      Spouse name: None     Number of children: None     Years of education: None     Highest education level: None   Tobacco Use     Smoking status: Never     Smokeless tobacco: Never   Substance and Sexual Activity     Alcohol use: Yes     Comment: occasional.     Drug use: Never     Sexual activity: Yes     Partners: Male       Video-Visit Details    Type of service:  Video Visit    Video Start Time: 1437  Video End Time:  18925  Duration: 16 minutes    Originating Location (pt. Location): Home    Distant Location (provider location):  [facility]     Platform used for Video Visit: Shahla Sy PA-C HUSSEIN Shepherd is a 44 year old who is being evaluated via a billable video visit.    How would you like to obtain your AVS? MyChart  If the video  "visit is dropped, the invitation should be resent by: Text to cell phone: [phone]  Will anyone else be joining your video visit? No    Video-Visit Details    Type of service:  Video Visit   Originating Location (pt. Location): Home    Distant Location (provider location):  On-site  Platform used for Video Visit: [employer] - Patient Reported  Systolic (Patient Reported): 133  Diastolic (Patient Reported): 84  Height (Patient Reported): 162.6 cm (5' 4\")      Vickie Carreon CMA      Thank you for allowing me to participate in the care of your patient.      Sincerely,     Tayler Sy PA-C     [facility]  cc:   Kate Neal MD  [address]  [place] [address]      "

## 2024-12-26 ENCOUNTER — TELEPHONE (OUTPATIENT)
Dept: MEDSURG UNIT | Facility: CLINIC | Age: 44
End: 2024-12-26
Payer: COMMERCIAL

## 2024-12-26 ENCOUNTER — TELEPHONE (OUTPATIENT)
Dept: CARDIOLOGY | Facility: CLINIC | Age: 44
End: 2024-12-26
Payer: COMMERCIAL

## 2024-12-26 DIAGNOSIS — I49.3 PVC'S (PREMATURE VENTRICULAR CONTRACTIONS): Primary | ICD-10-CM

## 2024-12-26 RX ORDER — LIDOCAINE 40 MG/G
CREAM TOPICAL
OUTPATIENT
Start: 2024-12-26

## 2024-12-26 RX ORDER — SODIUM CHLORIDE, SODIUM LACTATE, POTASSIUM CHLORIDE, CALCIUM CHLORIDE 600; 310; 30; 20 MG/100ML; MG/100ML; MG/100ML; MG/100ML
INJECTION, SOLUTION INTRAVENOUS CONTINUOUS
OUTPATIENT
Start: 2024-12-26

## 2024-12-26 NOTE — TELEPHONE ENCOUNTER
Spoke to patient to review instructions for PVC ablation scheduled for 12/27.  Patient to arrive at 6:30am.  Patient aware that she is to be NPO after midnight and may have clear liquids until 4:30am.  Patient has held diltiazem since 12/24.  She was instructed to hold all other medications listed on med sheet including prednisone day of procedure.  Patient aware that she will NOT be staying overnight after procedure unless there are complications.  Patient has arranged for ride and someone to be with her for the first 24 hours.  Patient to call [facility] at [phone] to alert if they going to cancel d/t illness or family emergency.  Patient provided verbal understanding regarding above.  MEGGAN Pedro

## 2024-12-26 NOTE — TELEPHONE ENCOUNTER
Chart reviewed for upcoming appt.  Nursing orders and CSE in epic.  Confirmed with ALYSSA Horn in clinic that pt is aware of 0630 arrival time.

## 2024-12-26 NOTE — PRE-PROCEDURE
GENERAL PRE-PROCEDURE:   Procedure:  PVC ablation    Written consent obtained?: Yes    Risks and benefits: Risks, benefits and alternatives were discussed    Consent given by:  Patient  Patient states understanding of procedure being performed: Yes    Patient's understanding of procedure matches consent: Yes    Procedure consent matches procedure scheduled: Yes    : MAC.  Appropriately NPO:  Yes  ASA Class:  1  Mallampati  :  Grade 2- soft palate, base of uvula, tonsillar pillars, and portion of posterior pharyngeal wall visible  Lungs:  Lungs clear with good breath sounds bilaterally  Heart:  Normal heart sounds and rate  History & Physical reviewed:  History and physical reviewed and no updates needed  Statement of review:  I have reviewed the lab findings, diagnostic data, medications, and the plan for sedation    44 year old female with frequent PVCs presenting for ablation.  Holter monitors have shown PVC burden up to 6%. PVCs have inferior axis left bundle morphology with precordial transition at V3 (EKG from 8/2024).

## 2024-12-27 ENCOUNTER — HOSPITAL ENCOUNTER (OUTPATIENT)
Facility: CLINIC | Age: 44
Discharge: HOME | End: 2024-12-27
Attending: INTERNAL MEDICINE | Admitting: INTERNAL MEDICINE
Payer: COMMERCIAL

## 2024-12-27 VITALS
WEIGHT: 180 LBS | HEART RATE: 84 BPM | RESPIRATION RATE: 16 BRPM | DIASTOLIC BLOOD PRESSURE: 84 MMHG | BODY MASS INDEX: 30.73 KG/M2 | TEMPERATURE: 98.2 F | HEIGHT: 64 IN | SYSTOLIC BLOOD PRESSURE: 117 MMHG | OXYGEN SATURATION: 95 %

## 2024-12-27 DIAGNOSIS — I49.3 PVC'S (PREMATURE VENTRICULAR CONTRACTIONS): ICD-10-CM

## 2024-12-27 LAB
ANION GAP SERPL CALCULATED.3IONS-SCNC: 13 MMOL/L (ref 7–15)
ATRIAL RATE - MUSE: 80 BPM
BUN SERPL-MCNC: 12.4 MG/DL (ref 6–20)
CALCIUM SERPL-MCNC: 9.2 MG/DL (ref 8.8–10.4)
CHLORIDE SERPL-SCNC: 102 MMOL/L (ref 98–107)
CREAT SERPL-MCNC: 0.7 MG/DL (ref 0.51–0.95)
DIASTOLIC BLOOD PRESSURE - MUSE: NORMAL MMHG
EGFRCR SERPLBLD CKD-EPI 2021: >90 ML/MIN/1.73M2
ERYTHROCYTE [DISTWIDTH] IN BLOOD BY AUTOMATED COUNT: 12.5 % (ref 10–15)
GLUCOSE SERPL-MCNC: 102 MG/DL (ref 70–99)
HCG INTACT+B SERPL-ACNC: <1 MIU/ML
HCO3 SERPL-SCNC: 23 MMOL/L (ref 22–29)
HCT VFR BLD AUTO: 39.9 % (ref 35–47)
HGB BLD-MCNC: 14.3 G/DL (ref 11.7–15.7)
INTERPRETATION ECG - MUSE: NORMAL
MCH RBC QN AUTO: 33.5 PG (ref 26.5–33)
MCHC RBC AUTO-ENTMCNC: 35.8 G/DL (ref 31.5–36.5)
MCV RBC AUTO: 93 FL (ref 78–100)
P AXIS - MUSE: 41 DEGREES
PLATELET # BLD AUTO: 294 10E3/UL (ref 150–450)
POTASSIUM SERPL-SCNC: 4 MMOL/L (ref 3.4–5.3)
PR INTERVAL - MUSE: 130 MS
QRS DURATION - MUSE: 78 MS
QT - MUSE: 360 MS
QTC - MUSE: 415 MS
R AXIS - MUSE: 93 DEGREES
RBC # BLD AUTO: 4.27 10E6/UL (ref 3.8–5.2)
SODIUM SERPL-SCNC: 138 MMOL/L (ref 135–145)
SYSTOLIC BLOOD PRESSURE - MUSE: NORMAL MMHG
T AXIS - MUSE: 58 DEGREES
VENTRICULAR RATE- MUSE: 80 BPM
WBC # BLD AUTO: 10.2 10E3/UL (ref 4–11)

## 2024-12-27 PROCEDURE — C1760 CLOSURE DEV, VASC: HCPCS | Performed by: INTERNAL MEDICINE

## 2024-12-27 PROCEDURE — 250N000009 HC RX 250: Performed by: INTERNAL MEDICINE

## 2024-12-27 PROCEDURE — C1732 CATH, EP, DIAG/ABL, 3D/VECT: HCPCS | Performed by: INTERNAL MEDICINE

## 2024-12-27 PROCEDURE — C1887 CATHETER, GUIDING: HCPCS | Performed by: INTERNAL MEDICINE

## 2024-12-27 PROCEDURE — 93620 COMP EP EVL R AT VEN PAC&REC: CPT

## 2024-12-27 PROCEDURE — 84702 CHORIONIC GONADOTROPIN TEST: CPT | Performed by: INTERNAL MEDICINE

## 2024-12-27 PROCEDURE — 999N000184 HC STATISTIC TELEMETRY

## 2024-12-27 PROCEDURE — 85014 HEMATOCRIT: CPT | Performed by: INTERNAL MEDICINE

## 2024-12-27 PROCEDURE — 93613 INTRACARDIAC EPHYS 3D MAPG: CPT | Performed by: INTERNAL MEDICINE

## 2024-12-27 PROCEDURE — 93623 PRGRMD STIMJ&PACG IV RX NFS: CPT | Performed by: INTERNAL MEDICINE

## 2024-12-27 PROCEDURE — 370N000017 HC ANESTHESIA TECHNICAL FEE, PER MIN: Performed by: INTERNAL MEDICINE

## 2024-12-27 PROCEDURE — 93620 COMP EP EVL R AT VEN PAC&REC: CPT | Mod: 26 | Performed by: INTERNAL MEDICINE

## 2024-12-27 PROCEDURE — C1730 CATH, EP, 19 OR FEW ELECT: HCPCS | Performed by: INTERNAL MEDICINE

## 2024-12-27 PROCEDURE — 36415 COLL VENOUS BLD VENIPUNCTURE: CPT | Performed by: INTERNAL MEDICINE

## 2024-12-27 PROCEDURE — 80048 BASIC METABOLIC PNL TOTAL CA: CPT | Performed by: INTERNAL MEDICINE

## 2024-12-27 PROCEDURE — 93005 ELECTROCARDIOGRAM TRACING: CPT

## 2024-12-27 PROCEDURE — 93010 ELECTROCARDIOGRAM REPORT: CPT | Mod: XU | Performed by: INTERNAL MEDICINE

## 2024-12-27 PROCEDURE — 272N000001 HC OR GENERAL SUPPLY STERILE: Performed by: INTERNAL MEDICINE

## 2024-12-27 PROCEDURE — 250N000011 HC RX IP 250 OP 636: Mod: JZ | Performed by: INTERNAL MEDICINE

## 2024-12-27 PROCEDURE — 999N000054 HC STATISTIC EKG NON-CHARGEABLE

## 2024-12-27 PROCEDURE — 93623 PRGRMD STIMJ&PACG IV RX NFS: CPT | Mod: 26 | Performed by: INTERNAL MEDICINE

## 2024-12-27 PROCEDURE — 999N000071 HC STATISTIC HEART CATH LAB OR EP LAB

## 2024-12-27 RX ORDER — ONDANSETRON 4 MG/1
4 TABLET, ORALLY DISINTEGRATING ORAL EVERY 30 MIN PRN
Status: DISCONTINUED | OUTPATIENT
Start: 2024-12-27 | End: 2024-12-27 | Stop reason: HOSPADM

## 2024-12-27 RX ORDER — NALOXONE HYDROCHLORIDE 0.4 MG/ML
0.4 INJECTION, SOLUTION INTRAMUSCULAR; INTRAVENOUS; SUBCUTANEOUS
Status: DISCONTINUED | OUTPATIENT
Start: 2024-12-27 | End: 2024-12-27 | Stop reason: HOSPADM

## 2024-12-27 RX ORDER — NALOXONE HYDROCHLORIDE 0.4 MG/ML
0.2 INJECTION, SOLUTION INTRAMUSCULAR; INTRAVENOUS; SUBCUTANEOUS
Status: DISCONTINUED | OUTPATIENT
Start: 2024-12-27 | End: 2024-12-27 | Stop reason: HOSPADM

## 2024-12-27 RX ORDER — ONDANSETRON 2 MG/ML
4 INJECTION INTRAMUSCULAR; INTRAVENOUS EVERY 30 MIN PRN
Status: DISCONTINUED | OUTPATIENT
Start: 2024-12-27 | End: 2024-12-27 | Stop reason: HOSPADM

## 2024-12-27 RX ORDER — FENTANYL CITRATE 50 UG/ML
50 INJECTION, SOLUTION INTRAMUSCULAR; INTRAVENOUS EVERY 5 MIN PRN
Status: DISCONTINUED | OUTPATIENT
Start: 2024-12-27 | End: 2024-12-27 | Stop reason: HOSPADM

## 2024-12-27 RX ORDER — SODIUM CHLORIDE, SODIUM LACTATE, POTASSIUM CHLORIDE, CALCIUM CHLORIDE 600; 310; 30; 20 MG/100ML; MG/100ML; MG/100ML; MG/100ML
INJECTION, SOLUTION INTRAVENOUS CONTINUOUS
Status: DISCONTINUED | OUTPATIENT
Start: 2024-12-27 | End: 2024-12-27 | Stop reason: HOSPADM

## 2024-12-27 RX ORDER — LIDOCAINE 40 MG/G
CREAM TOPICAL
Status: DISCONTINUED | OUTPATIENT
Start: 2024-12-27 | End: 2024-12-27 | Stop reason: HOSPADM

## 2024-12-27 RX ORDER — SODIUM CHLORIDE, SODIUM LACTATE, POTASSIUM CHLORIDE, CALCIUM CHLORIDE 600; 310; 30; 20 MG/100ML; MG/100ML; MG/100ML; MG/100ML
INJECTION, SOLUTION INTRAVENOUS CONTINUOUS
Status: DISCONTINUED | OUTPATIENT
Start: 2024-12-27 | End: 2024-12-27

## 2024-12-27 RX ORDER — ACETAMINOPHEN 325 MG/1
650 TABLET ORAL EVERY 4 HOURS PRN
Status: DISCONTINUED | OUTPATIENT
Start: 2024-12-27 | End: 2024-12-27 | Stop reason: HOSPADM

## 2024-12-27 RX ORDER — FENTANYL CITRATE 50 UG/ML
25 INJECTION, SOLUTION INTRAMUSCULAR; INTRAVENOUS EVERY 5 MIN PRN
Status: DISCONTINUED | OUTPATIENT
Start: 2024-12-27 | End: 2024-12-27 | Stop reason: HOSPADM

## 2024-12-27 RX ORDER — ACETAMINOPHEN 500 MG
1000 TABLET ORAL ONCE
Status: DISCONTINUED | OUTPATIENT
Start: 2024-12-27 | End: 2024-12-27 | Stop reason: HOSPADM

## 2024-12-27 RX ORDER — HYDROMORPHONE HYDROCHLORIDE 1 MG/ML
0.4 INJECTION, SOLUTION INTRAMUSCULAR; INTRAVENOUS; SUBCUTANEOUS EVERY 5 MIN PRN
Status: DISCONTINUED | OUTPATIENT
Start: 2024-12-27 | End: 2024-12-27 | Stop reason: HOSPADM

## 2024-12-27 RX ORDER — DEXAMETHASONE SODIUM PHOSPHATE 4 MG/ML
4 INJECTION, SOLUTION INTRA-ARTICULAR; INTRALESIONAL; INTRAMUSCULAR; INTRAVENOUS; SOFT TISSUE
Status: DISCONTINUED | OUTPATIENT
Start: 2024-12-27 | End: 2024-12-27 | Stop reason: HOSPADM

## 2024-12-27 RX ORDER — CALCIUM GLUCONATE 20 MG/ML
1 INJECTION, SOLUTION INTRAVENOUS ONCE
Status: COMPLETED | OUTPATIENT
Start: 2024-12-27 | End: 2024-12-27

## 2024-12-27 RX ORDER — HYDROMORPHONE HYDROCHLORIDE 1 MG/ML
0.2 INJECTION, SOLUTION INTRAMUSCULAR; INTRAVENOUS; SUBCUTANEOUS EVERY 5 MIN PRN
Status: DISCONTINUED | OUTPATIENT
Start: 2024-12-27 | End: 2024-12-27 | Stop reason: HOSPADM

## 2024-12-27 RX ORDER — NALOXONE HYDROCHLORIDE 0.4 MG/ML
0.1 INJECTION, SOLUTION INTRAMUSCULAR; INTRAVENOUS; SUBCUTANEOUS
Status: DISCONTINUED | OUTPATIENT
Start: 2024-12-27 | End: 2024-12-27 | Stop reason: HOSPADM

## 2024-12-27 RX ADMIN — CALCIUM GLUCONATE 1 G: 20 INJECTION, SOLUTION INTRAVENOUS at 09:34

## 2024-12-27 ASSESSMENT — ACTIVITIES OF DAILY LIVING (ADL)
ADLS_ACUITY_SCORE: 47
ADLS_ACUITY_SCORE: 45
ADLS_ACUITY_SCORE: 47

## 2024-12-27 NOTE — PROGRESS NOTES
[facility] Discharge Nursing Note    Patient Information  Name: Cora Connell  Age: 44 year old    Discharge Education:  Discharge instructions reviewed: Yes  Additional education/resources provided: AVS and [facility] pamphlet/card.  Patient/patient representative verbalizes understanding: Yes  Patient discharging on new medications: No  Medication education completed: Yes    Discharge Plans:   Discharge location: home  Discharge ride contacted: Yes  Approximate discharge time: 1330    Discharge Criteria:  Discharge criteria met and vital signs stable: Yes. Tolerated yogurt and coffee. Ambulated around nurses station, steady on her feet, right groin remained CDI, no swelling.    Patient Belongs:  Patient belongings returned to patient: Yes    Ella Richmond RN

## 2024-12-27 NOTE — PROGRESS NOTES
[facility] Post Procedure Note    Patient Information  Name: Cora Connell  Age: 44 year old    Post Procedure  Time patient returned to [facility]: 11:15 am  Concerns/abnormal assessment: None  Plan/Other: Bedrest for two hours.  Off bedrest at 12:15 pm.  Review discharge instructions.  Discharge to home when patient meets criteria at approximately 13:00.

## 2024-12-27 NOTE — DISCHARGE INSTRUCTIONS
No in lab complications PVC Ablation Discharge Instructions - Femoral     After you go home:    Have an adult stay with you until tomorrow.  You may resume your normal diet.       For 24 hours - due to the sedation you received:  Relax and take it easy.  Do NOT make any important or legal decisions.  Do NOT drive or operate machines at home or at work.  Do NOT drink alcohol.    Care of Groin Puncture Site:    For the first 24 hrs - check the puncture site every 1-2 hours while awake.  For 2 days, when you cough, sneeze, laugh or move your bowels, hold your hand over the puncture site and press firmly.  Remove the bandaid after 24 hours. If there is minor oozing, apply another bandaid and remove it after 12 hours.  It is normal to have bruising or pea size lump at the site.  You may shower tomorrow.  Do NOT take a bath, or use a hot tub or pool for at least 3 days. Do NOT scrub the site. Do NOT use lotion or powder near the puncture site.    Activity:            For 3 days:  No stooping or squatting  Do NOT do any heavy activity such as exercise, lifting, or straining.   No housework, yard work or any activity that make you sweat  Do NOT lift more than 10 pounds  Limit going up and down the stairs repetitively, for the first 24 hours after procedure.     Bleeding:    If you start bleeding from the site in your groin, lie down flat and press firmly on the site for 10 minutes.   Once bleeding stops, lay flat for 2 hours.  Call [facility] as soon as you can.       Call 911 right away if you have heavy bleeding or bleeding that does not stop.      Medicines:    Take your medications, including blood thinners, unless your provider tells you not to.  If you have stopped any medicines, check with your provider about when to restart them.  If you have pain or shortness of breath, you may take Advil (ibuprofen) or Tylenol (acetaminophen).    Follow Up Appointments:    1/27/2025 with JAUN Estrada at 7:30am in [place]  You will get a call  from Beena RN on Monday, 12/30 to see how you are doing    Call the clinic if:    You have increased pain or a large or growing hard lump around the site.  The site is red, swollen, hot or tender.  Blood or fluid is draining from the site.  You have chills or a fever greater than 101 F (38 C).  Your leg feels numb, cool or changes color.  Increased pain in the chest and/or groin.  Increased shortness of breath  Chest pain not relieved by Tylenol or Advil  New pain in the back or belly that you cannot control with Tylenol.  Recurrent irregular or fast heart rate.  Any questions or concerns.         [facility]:    [phone] ( 8am-5pm M-F)  Beena Ernandez or Keisha    On call Cardiologist [phone] (Option 2) (7 days a week)

## 2024-12-27 NOTE — PROGRESS NOTES
[facility] Admission Nursing Note    Patient Information  Name: Cora Connell  Age: 44 year old  Reason for admission: PVC ablation.  [facility] arrival time: 0630    Visitor Information  Name: Yoshi     Patient Admission/Assessment   Pre-procedure assessment complete: Yes  If abnormal assessment/labs, provider notified: N/A  NPO: Yes  Medications held per instructions/orders: Yes  Consent: obtained  If applicable, pregnancy test status: obtained  Patient oriented to room: Yes  Education/questions answered: Yes  Plan/other: Proceed as scheduled/ordered.    Discharge Planning  Discharge name/phone number: Yoshi-spouse [phone]  Overnight post sedation caregiver: Yoshi.  Discharge location: home    Ella Richmond RN     [id number] to EP lab.

## 2024-12-30 ENCOUNTER — TELEPHONE (OUTPATIENT)
Dept: CARDIOLOGY | Facility: CLINIC | Age: 44
End: 2024-12-30
Payer: COMMERCIAL

## 2024-12-30 NOTE — TELEPHONE ENCOUNTER
Left message for patient in follow up to PVC ablation on 12/27.  Waiting call back from patient.  MEGGAN Pedro

## 2025-01-05 ENCOUNTER — HEALTH MAINTENANCE LETTER (OUTPATIENT)
Age: 45
End: 2025-01-05

## 2025-01-24 NOTE — PROGRESS NOTES
"[facility]    I had the pleasure of seeing Cora when she came for follow up of PVCs.  This 44 year old sees Dr. Mata for her history of:    1.  PVCs -palpitations starting ~.  Increasing burden, up to 6% 2024, with side effects from BB.  Switched to CCB and now s/p EP Study 2024 showing no inducible PVCs.       Last Visit & Interval History:  I met Cora virtually 2024 and we discussed upcoming ablation after holding diltiazem 2 days. EP study  revealed no inducible PVCs despite isoproterenol and calcium gluconate infusion. Sedation was completely turned off and still no PVCs were inducible.  No changes were made and Diltiazem was continued.     Today's Visit:  Cora is \"feeling good!\" Thinks Diltiazem continues to work well to suppress PVCs and wonders if would need to come off of it for a longer period of time before trying another ablation.    No complaints of chest pain, pressure or tightness.  No orthopnea, PND or edema. No change in exercise tolerance or breathing. Overall, she is feeling well.         VITALS:  Vitals: /76   Pulse 79   Ht 1.626 m (5' 4\")   Wt 82.6 kg (182 lb)   BMI 31.24 kg/m      Diagnostic Testinh Holter 10/2024 SR with PVCs. In SR, avg HR 79 bpm (63-111bpm) 3% PVC burden, <,1% PACs.  Event marked c/w SR 75 and bigeminal PVC  Stress Test 10/2024 -normal stress EKG  ZioPatch 2024 SR with frequent PVCs and SVT.   6% PVC burden.  5 runs of SVT, longest lasting 13s.  Symptoms a/w frequent PVCs  Echocardiogram 10/2023-EF 55 to 60%      Plan:  Annual follow-up. Given nl echo and low burden, OK to continue routine follow-up with PCP for Diltiazem Rx if she'd prefer and if OK with PCP.    Assessment/Plan:    PVCs  Unfortunately, not inducible at time of EP Study 2024 despite holding Diltiazem   Last Holter 10/2024 on Metoprolol XL 25 mg daily showed 3% PVC burden. Had subsequently been switched to Diltiazem given c/o fatigue on BB " "therapy  Echo 10/2023 with nl LVEF  Nl stress test 10/2024    PLAN:  Continue Diltiazem. Can always attempt coming off of this and trying PRN short acting diltiazem 30 mg up to QID if burden remains low   Annual follow-up       Fela Sy PA-C, MSPAS      No orders of the defined types were placed in this encounter.    No orders of the defined types were placed in this encounter.    Medications Discontinued During This Encounter   Medication Reason    predniSONE (DELTASONE) 10 MG tablet Therapy completed (No AVS)         No diagnosis found.    CURRENT MEDICATIONS:  Current Outpatient Medications   Medication Sig Dispense Refill    Cholecalciferol (VITAMIN D-3 PO) Take by mouth every 7 days. Unsure dosage, otc      diltiazem ER (DILT-XR) 120 MG 24 hr capsule Take 1 capsule (120 mg) by mouth daily. 90 capsule 3    ELDERBERRY PO Take by mouth daily.      magnesium 250 MG tablet Take 1 tablet by mouth daily      vitamin C with B complex (B COMPLEX-C) tablet Take 1 tablet by mouth daily         ALLERGIES     Allergies   Allergen Reactions    Ancef [Cefazolin Sodium]      No redness or hives or swelling noted with administration.  Tolerated without issue 2/9/18.         Review of Systems:  Skin:  Negative     Eyes:  Negative    ENT:  Negative    Respiratory:  Positive for sleep apnea  Cardiovascular:    Positive for, palpitations  Gastroenterology: Negative    Genitourinary:  Negative    Musculoskeletal:  Negative    Neurologic:  Negative    Psychiatric:  Negative    Heme/Lymph/Imm:  Negative    Endocrine:  Negative      Physical Exam:  Vitals: /76   Pulse 79   Ht 1.626 m (5' 4\")   Wt 82.6 kg (182 lb)   BMI 31.24 kg/m      Constitutional:  cooperative, alert and oriented, well developed, well nourished, in no acute distress        Skin:  warm and dry to the touch, no apparent skin lesions or masses noted        Head:  normocephalic, no masses or lesions        Eyes:  pupils equal and round, conjunctivae and " lids unremarkable, sclera white        ENT:  no pallor or cyanosis, dentition good        Neck:  not assessed this visit        Chest:  normal breath sounds, clear to auscultation, normal A-P diameter, normal symmetry, normal respiratory excursion, no use of accessory muscles        Cardiac: regular rhythm                  Abdomen:  not assessed this visit        Vascular: not assessed this visit                                      Extremities and Back:  not assessed this visit        Neurological:  no gross motor deficits            PAST MEDICAL HISTORY:  Past Medical History:   Diagnosis Date    Abnormal Pap smear 2000    LEEP    Depressive disorder     Postpartum depression     PVC's (premature ventricular contractions)     Varicosities     LE       PAST SURGICAL HISTORY:  Past Surgical History:   Procedure Laterality Date    EP ABLATION PVC N/A 12/27/2024    Procedure: Ablation Premature Ventricular Contractions;  Surgeon: Yosvany Mata MD;  Location:  HEART CARDIAC CATH LAB    EXCISE LESION UPPER EXTREMITY  1/2/2014    Procedure: EXCISE LESION UPPER EXTREMITY;  EXCISION OF LEFT SHOULDER MASS ;  Surgeon: Nena Zavaleta MD;  Location: [facility] REMOVAL OF TONSILS,<11 Y/O      5 years of age    LAPAROSCOPIC CYSTECTOMY OVARIAN (BENIGN) Right 2/9/2018    Procedure: LAPAROSCOPIC CYSTECTOMY OVARIAN (BENIGN);;  Surgeon: Latasha Arceo MD;  Location:  OR    LAPAROSCOPY DIAGNOSTIC (GYN) Right 2/9/2018    Procedure: LAPAROSCOPY DIAGNOSTIC (GYN);  DIAGNOSTIC LAPAROSCOPY, RIGHT OVARIAN CYSTECTOMY.;  Surgeon: Latasha Arceo MD;  Location:  OR    LEEP TX, CERVICAL         FAMILY HISTORY:  Family History   Problem Relation Age of Onset    Coronary Artery Disease Father         CABG at age 68 years, smoker.       SOCIAL HISTORY:  Social History     Socioeconomic History    Marital status:      Spouse name: None    Number of children: None    Years of education: None    Highest education level: None    Tobacco Use    Smoking status: Never    Smokeless tobacco: Never   Substance and Sexual Activity    Alcohol use: Yes     Comment: occasional.    Drug use: Never    Sexual activity: Yes     Partners: Male     Social Drivers of Health     Interpersonal Safety: Low Risk  (12/27/2024)    Interpersonal Safety     Do you feel physically and emotionally safe where you currently live?: Yes     Within the past 12 months, have you been hit, slapped, kicked or otherwise physically hurt by someone?: No     Within the past 12 months, have you been humiliated or emotionally abused in other ways by your partner or ex-partner?: No

## 2025-01-27 ENCOUNTER — OFFICE VISIT (OUTPATIENT)
Dept: CARDIOLOGY | Facility: CLINIC | Age: 45
End: 2025-01-27
Payer: COMMERCIAL

## 2025-01-27 VITALS
WEIGHT: 182 LBS | SYSTOLIC BLOOD PRESSURE: 120 MMHG | HEIGHT: 64 IN | BODY MASS INDEX: 31.07 KG/M2 | HEART RATE: 79 BPM | DIASTOLIC BLOOD PRESSURE: 76 MMHG

## 2025-01-27 DIAGNOSIS — I49.3 PVC'S (PREMATURE VENTRICULAR CONTRACTIONS): Primary | ICD-10-CM

## 2025-01-27 PROCEDURE — 99213 OFFICE O/P EST LOW 20 MIN: CPT | Performed by: PHYSICIAN ASSISTANT

## 2025-01-27 NOTE — LETTER
"2025    Garry Katz MD  [address]  [place] [address]    RE: Cora R Ko       Dear Colleague,     I had the pleasure of seeing Cora R Connell in the [facility].  [facility]    I had the pleasure of seeing Cora when she came for follow up of PVCs.  This 44 year old sees Dr. Mata for her history of:    1.  PVCs -palpitations starting ~.  Increasing burden, up to 6% 2024, with side effects from BB.  Switched to CCB and now s/p EP Study 2024 showing no inducible PVCs.       Last Visit & Interval History:  I met Cora virtually 2024 and we discussed upcoming ablation after holding diltiazem 2 days. EP study  revealed no inducible PVCs despite isoproterenol and calcium gluconate infusion. Sedation was completely turned off and still no PVCs were inducible.  No changes were made and Diltiazem was continued.     Today's Visit:  Cora is \"feeling good!\" Thinks Diltiazem continues to work well to suppress PVCs and wonders if would need to come off of it for a longer period of time before trying another ablation.    No complaints of chest pain, pressure or tightness.  No orthopnea, PND or edema. No change in exercise tolerance or breathing. Overall, she is feeling well.         VITALS:  Vitals: /76   Pulse 79   Ht 1.626 m (5' 4\")   Wt 82.6 kg (182 lb)   BMI 31.24 kg/m      Diagnostic Testinh Holter 10/2024 SR with PVCs. In SR, avg HR 79 bpm (63-111bpm) 3% PVC burden, <,1% PACs.  Event marked c/w SR 75 and bigeminal PVC  Stress Test 10/2024 -normal stress EKG  ZioPatch 2024 SR with frequent PVCs and SVT.   6% PVC burden.  5 runs of SVT, longest lasting 13s.  Symptoms a/w frequent PVCs  Echocardiogram 10/2023-EF 55 to 60%      Plan:  Annual follow-up. Given nl echo and low burden, OK to continue routine follow-up with PCP for Diltiazem Rx if she'd prefer and if OK with PCP.    Assessment/Plan:    PVCs  Unfortunately, " "not inducible at time of EP Study 12/27/2024 despite holding Diltiazem   Last Holter 10/2024 on Metoprolol XL 25 mg daily showed 3% PVC burden. Had subsequently been switched to Diltiazem given c/o fatigue on BB therapy  Echo 10/2023 with nl LVEF  Nl stress test 10/2024    PLAN:  Continue Diltiazem. Can always attempt coming off of this and trying PRN short acting diltiazem 30 mg up to QID if burden remains low   Annual follow-up       Fela Sy PA-C, MSPAS      No orders of the defined types were placed in this encounter.    No orders of the defined types were placed in this encounter.    Medications Discontinued During This Encounter   Medication Reason     predniSONE (DELTASONE) 10 MG tablet Therapy completed (No AVS)         No diagnosis found.    CURRENT MEDICATIONS:  Current Outpatient Medications   Medication Sig Dispense Refill     Cholecalciferol (VITAMIN D-3 PO) Take by mouth every 7 days. Unsure dosage, otc       diltiazem ER (DILT-XR) 120 MG 24 hr capsule Take 1 capsule (120 mg) by mouth daily. 90 capsule 3     ELDERBERRY PO Take by mouth daily.       magnesium 250 MG tablet Take 1 tablet by mouth daily       vitamin C with B complex (B COMPLEX-C) tablet Take 1 tablet by mouth daily         ALLERGIES     Allergies   Allergen Reactions     Ancef [Cefazolin Sodium]      No redness or hives or swelling noted with administration.  Tolerated without issue 2/9/18.         Review of Systems:  Skin:  Negative     Eyes:  Negative    ENT:  Negative    Respiratory:  Positive for sleep apnea  Cardiovascular:    Positive for, palpitations  Gastroenterology: Negative    Genitourinary:  Negative    Musculoskeletal:  Negative    Neurologic:  Negative    Psychiatric:  Negative    Heme/Lymph/Imm:  Negative    Endocrine:  Negative      Physical Exam:  Vitals: /76   Pulse 79   Ht 1.626 m (5' 4\")   Wt 82.6 kg (182 lb)   BMI 31.24 kg/m      Constitutional:  cooperative, alert and oriented, well developed, well " nourished, in no acute distress        Skin:  warm and dry to the touch, no apparent skin lesions or masses noted        Head:  normocephalic, no masses or lesions        Eyes:  pupils equal and round, conjunctivae and lids unremarkable, sclera white        ENT:  no pallor or cyanosis, dentition good        Neck:  not assessed this visit        Chest:  normal breath sounds, clear to auscultation, normal A-P diameter, normal symmetry, normal respiratory excursion, no use of accessory muscles        Cardiac: regular rhythm                  Abdomen:  not assessed this visit        Vascular: not assessed this visit                                      Extremities and Back:  not assessed this visit        Neurological:  no gross motor deficits            PAST MEDICAL HISTORY:  Past Medical History:   Diagnosis Date     Abnormal Pap smear 2000    LEEP     Depressive disorder      Postpartum depression      PVC's (premature ventricular contractions)      Varicosities     LE       PAST SURGICAL HISTORY:  Past Surgical History:   Procedure Laterality Date     EP ABLATION PVC N/A 12/27/2024    Procedure: Ablation Premature Ventricular Contractions;  Surgeon: Yosvany Mata MD;  Location:  HEART CARDIAC CATH LAB     EXCISE LESION UPPER EXTREMITY  1/2/2014    Procedure: EXCISE LESION UPPER EXTREMITY;  EXCISION OF LEFT SHOULDER MASS ;  Surgeon: Nena Zavaleta MD;  Location: [facility] REMOVAL OF TONSILS,<11 Y/O      5 years of age     LAPAROSCOPIC CYSTECTOMY OVARIAN (BENIGN) Right 2/9/2018    Procedure: LAPAROSCOPIC CYSTECTOMY OVARIAN (BENIGN);;  Surgeon: Latasha Arceo MD;  Location:  OR     LAPAROSCOPY DIAGNOSTIC (GYN) Right 2/9/2018    Procedure: LAPAROSCOPY DIAGNOSTIC (GYN);  DIAGNOSTIC LAPAROSCOPY, RIGHT OVARIAN CYSTECTOMY.;  Surgeon: Latasha Arceo MD;  Location:  OR     LEEP TX, CERVICAL         FAMILY HISTORY:  Family History   Problem Relation Age of Onset     Coronary Artery Disease Father          CABG at age 68 years, smoker.       SOCIAL HISTORY:  Social History     Socioeconomic History     Marital status:      Spouse name: None     Number of children: None     Years of education: None     Highest education level: None   Tobacco Use     Smoking status: Never     Smokeless tobacco: Never   Substance and Sexual Activity     Alcohol use: Yes     Comment: occasional.     Drug use: Never     Sexual activity: Yes     Partners: Male     Social Drivers of Health     Interpersonal Safety: Low Risk  (12/27/2024)    Interpersonal Safety      Do you feel physically and emotionally safe where you currently live?: Yes      Within the past 12 months, have you been hit, slapped, kicked or otherwise physically hurt by someone?: No      Within the past 12 months, have you been humiliated or emotionally abused in other ways by your partner or ex-partner?: No             Thank you for allowing me to participate in the care of your patient.      Sincerely,     Tayler Sy PA-C     [facility]  cc:   Garry Katz MD  [address]  [place] [id number]

## 2025-01-27 NOTE — PATIENT INSTRUCTIONS
"Cora - it was nice to see you today!    Today we reviewed:    EP Study unfortunately resulted in no ablation b/c no PVCs were induced     MEDICATION CHANGES:      NONE    RECOMMENDATIONS:    Let us know if want to try different medications or taking \"as needed\"    FOLLOW UP:    Annual follow-up     IMPORTANT PHONE NUMBERS FOR  [facility] ([place]):  Arrhythmia nurses Beena Ernandez, & Keisha: [phone]      "

## 2025-05-21 ENCOUNTER — ANCILLARY PROCEDURE (OUTPATIENT)
Dept: MRI IMAGING | Facility: CLINIC | Age: 45
End: 2025-05-21
Payer: COMMERCIAL

## 2025-05-21 DIAGNOSIS — R51.9 HEAD ACHE: ICD-10-CM

## 2025-05-21 DIAGNOSIS — R20.2 FACIAL TINGLING: ICD-10-CM

## 2025-05-21 PROCEDURE — 70551 MRI BRAIN STEM W/O DYE: CPT

## 2025-06-23 RX ORDER — DIMENHYDRINATE 50 MG
1 TABLET ORAL DAILY
COMMUNITY

## 2025-06-23 RX ORDER — UBIDECARENONE 100 MG
100 CAPSULE ORAL DAILY
COMMUNITY

## 2025-06-27 ENCOUNTER — APPOINTMENT (OUTPATIENT)
Dept: ULTRASOUND IMAGING | Facility: CLINIC | Age: 45
End: 2025-06-27
Attending: EMERGENCY MEDICINE
Payer: COMMERCIAL

## 2025-06-27 ENCOUNTER — HOSPITAL ENCOUNTER (EMERGENCY)
Facility: CLINIC | Age: 45
Discharge: HOME | End: 2025-06-27
Attending: EMERGENCY MEDICINE | Admitting: EMERGENCY MEDICINE
Payer: COMMERCIAL

## 2025-06-27 VITALS
WEIGHT: 180 LBS | DIASTOLIC BLOOD PRESSURE: 86 MMHG | SYSTOLIC BLOOD PRESSURE: 135 MMHG | TEMPERATURE: 97.9 F | HEIGHT: 64 IN | OXYGEN SATURATION: 96 % | RESPIRATION RATE: 28 BRPM | BODY MASS INDEX: 30.73 KG/M2 | HEART RATE: 76 BPM

## 2025-06-27 DIAGNOSIS — M79.604 RIGHT LEG PAIN: ICD-10-CM

## 2025-06-27 LAB
ATRIAL RATE - MUSE: 83 BPM
DIASTOLIC BLOOD PRESSURE - MUSE: NORMAL MMHG
INTERPRETATION ECG - MUSE: NORMAL
P AXIS - MUSE: 60 DEGREES
PR INTERVAL - MUSE: 132 MS
QRS DURATION - MUSE: 76 MS
QT - MUSE: 364 MS
QTC - MUSE: 427 MS
R AXIS - MUSE: 63 DEGREES
SYSTOLIC BLOOD PRESSURE - MUSE: NORMAL MMHG
T AXIS - MUSE: 59 DEGREES
VENTRICULAR RATE- MUSE: 83 BPM

## 2025-06-27 PROCEDURE — 93971 EXTREMITY STUDY: CPT | Mod: RT

## 2025-06-27 PROCEDURE — 93005 ELECTROCARDIOGRAM TRACING: CPT

## 2025-06-27 PROCEDURE — 99284 EMERGENCY DEPT VISIT MOD MDM: CPT | Mod: 25

## 2025-06-27 ASSESSMENT — COLUMBIA-SUICIDE SEVERITY RATING SCALE - C-SSRS
2. HAVE YOU ACTUALLY HAD ANY THOUGHTS OF KILLING YOURSELF IN THE PAST MONTH?: NO
6. HAVE YOU EVER DONE ANYTHING, STARTED TO DO ANYTHING, OR PREPARED TO DO ANYTHING TO END YOUR LIFE?: NO
1. IN THE PAST MONTH, HAVE YOU WISHED YOU WERE DEAD OR WISHED YOU COULD GO TO SLEEP AND NOT WAKE UP?: NO

## 2025-06-27 ASSESSMENT — ACTIVITIES OF DAILY LIVING (ADL)
ADLS_ACUITY_SCORE: 47

## 2025-06-27 NOTE — ED PROVIDER NOTES
Emergency Department Note      History of Present Illness     Chief Complaint   Leg Pain      HPI   Cora Connell is a 44 year old female history of palpitations, depression, PVCs, varicosities, sciatica, scoliosis presents with concerns for right leg pain and swelling.  She notes she does have some sciatica but has some intermittent pain discomfort sometimes in the right inner calf and other times in the upper thigh.  She has an upcoming surgery and is concerned about a possible DVT.  No history of DVTs no hormone replacement and no trips or travels.  She is here to rule out DVT.    Independent Historian   None    Review of External Notes   Clinic notes    Past Medical History     Medical History and Problem List   Past Medical History:   Diagnosis Date    Abnormal Pap smear 2000    Arrhythmia     Depressive disorder     Irregular heart beat     Postpartum depression     PVC's (premature ventricular contractions)     Varicosities        Medications   Ashwagandha 120 MG CAPS  Cholecalciferol (VITAMIN D-3 PO)  co-enzyme Q-10 100 MG CAPS capsule  diltiazem ER (DILT-XR) 120 MG 24 hr capsule  Flaxseed, Linseed, (FLAX SEED OIL) 1000 MG capsule  magnesium 250 MG tablet  Turmeric 5-1000 MG CAPS  vitamin C with B complex (B COMPLEX-C) tablet        Surgical History   Past Surgical History:   Procedure Laterality Date    EP ABLATION PVC N/A 12/27/2024    Procedure: Ablation Premature Ventricular Contractions;  Surgeon: Yosvany Mata MD;  Location:  HEART CARDIAC CATH LAB    EXCISE LESION UPPER EXTREMITY  1/2/2014    Procedure: EXCISE LESION UPPER EXTREMITY;  EXCISION OF LEFT SHOULDER MASS ;  Surgeon: Nena Zavaleta MD;  Location: [facility] REMOVAL OF TONSILS,<13 Y/O      5 years of age    LAPAROSCOPIC CYSTECTOMY OVARIAN (BENIGN) Right 2/9/2018    Procedure: LAPAROSCOPIC CYSTECTOMY OVARIAN (BENIGN);;  Surgeon: Latasha Arceo MD;  Location:  OR    LAPAROSCOPY DIAGNOSTIC (GYN) Right 2/9/2018    Procedure:  "LAPAROSCOPY DIAGNOSTIC (GYN);  DIAGNOSTIC LAPAROSCOPY, RIGHT OVARIAN CYSTECTOMY.;  Surgeon: Latasha Arceo MD;  Location:  OR    [facility] [place], CERVICAL         Physical Exam     Patient Vitals for the past 24 hrs:   BP Temp Temp src Pulse Resp SpO2 Height Weight   06/27/25 0915 135/86 -- -- 76 28 96 % -- --   06/27/25 0900 130/85 -- -- 69 23 95 % -- --   06/27/25 0845 122/81 -- -- 75 23 96 % -- --   06/27/25 0830 126/86 -- -- 77 10 98 % -- --   06/27/25 0800 126/77 -- -- 72 22 97 % -- --   06/27/25 0745 (!) 127/92 -- -- 81 -- 98 % -- --   06/27/25 0730 121/82 -- -- 73 18 94 % -- --   06/27/25 0715 125/81 -- -- 89 19 98 % -- --   06/27/25 0700 125/87 -- -- 79 20 95 % -- --   06/27/25 0647 128/82 -- -- 75 22 95 % -- --   06/27/25 0630 (!) 136/90 -- -- 80 12 97 % -- --   06/27/25 0615 119/88 -- -- 81 19 98 % -- --   06/27/25 0554 (!) 156/99 97.9  F (36.6  C) Oral 98 16 98 % 1.626 m (5' 4\") 81.6 kg (180 lb)     Physical Exam  GENERAL: well developed, pleasant  HEAD: atraumatic  EYES: pupils reactive, extraocular muscles intact, conjunctivae normal  ENT:  mucus membranes moist  NECK:  trachea midline, normal range of motion  RESPIRATORY: no tachypnea, breath sounds clear to auscultation   CVS: normal S1/S2, no murmurs, intact distal pulses  ABDOMEN: soft, nontender, nondistention  MUSCULOSKELETAL: no deformities  SKIN: warm and dry, no acute rashes or ulceration  NEURO: GCS 15, cranial nerves intact, alert and oriented x3  PSYCH:  Mood/affect normal      Diagnostics     Lab Results   Labs Ordered and Resulted from Time of ED Arrival to Time of ED Departure - No data to display    Imaging   US Lower Extremity Venous Duplex Right   Final Result   IMPRESSION:   1.  No deep venous thrombosis in the right lower extremity.          ECG results from 06/27/25   EKG 12-lead, tracing only     Value    Systolic Blood Pressure     Diastolic Blood Pressure     Ventricular Rate 83    Atrial Rate 83    [place] Interval 132    QRS " Duration 76        QTc 427    P Axis 60    R AXIS 63    T Axis 59    Interpretation ECG      Sinus rhythm  Nonspecific ST abnormality  Abnormal ECG  When compared with ECG of 27-Dec-2024 07:15,  No significant change was found  Confirmed by GENERATED REPORT, COMPUTER ([id number]),  Chadwick Rossi ([id number]) on 6/27/2025 7:03:25 AM           Independent Interpretation   None    ED Course      Medications Administered   Medications - No data to display    Procedures   Procedures     Discussion of Management   None    ED Course        Additional Documentation  None    Medical Decision Making / Diagnosis     CMS Diagnoses: None    MIPS   None               MDM   Cora Connell is a 44 year old female presents with intermittent pain to the right leg with known history of sciatica but concerns for possible DVT as she is felt random pains and possible swelling.  Ultrasounds negative for DVT.  No signs of cellulitis.  Intact dorsalis pedis pulse.  Patient was given reassurance.    Disposition   The patient was discharged.     Diagnosis     ICD-10-CM    1. Right leg pain  M79.604            Discharge Medications   Discharge Medication List as of 6/27/2025  9:44 AM            MD Young Messer Shaun L, MD  06/27/25 7909

## 2025-06-27 NOTE — ED TRIAGE NOTES
Patient states hx of heart palpitations.  Takes diltiazem.    1 month right leg swelling, heaviness, tight feeling.  Bulge to calf noticed 1.5-2 weeks ago & pain.  Whole right leg some numbness, tingling.  Hx of scoliosis.  Sees chiropractor & thought it was from sciatic nerve.  Has hysterectomy on Mon.        Triage Assessment (Adult)       Row Name 06/27/25 0551          Triage Assessment    Airway WDL WDL        Respiratory WDL    Respiratory WDL WDL        Cardiac WDL    Cardiac WDL WDL        Cognitive/Neuro/Behavioral WDL    Cognitive/Neuro/Behavioral WDL WDL

## 2025-06-30 ENCOUNTER — ANESTHESIA (OUTPATIENT)
Dept: SURGERY | Facility: CLINIC | Age: 45
End: 2025-06-30
Payer: COMMERCIAL

## 2025-06-30 ENCOUNTER — HOSPITAL ENCOUNTER (OUTPATIENT)
Facility: CLINIC | Age: 45
Discharge: HOME | End: 2025-07-01
Attending: OBSTETRICS & GYNECOLOGY | Admitting: OBSTETRICS & GYNECOLOGY
Payer: COMMERCIAL

## 2025-06-30 ENCOUNTER — ANESTHESIA EVENT (OUTPATIENT)
Dept: SURGERY | Facility: CLINIC | Age: 45
End: 2025-06-30
Payer: COMMERCIAL

## 2025-06-30 DIAGNOSIS — Z98.890 POST-OPERATIVE STATE: Primary | ICD-10-CM

## 2025-06-30 LAB
HCG UR QL: NEGATIVE
HGB BLD-MCNC: 13 G/DL (ref 11.7–15.7)
MCV RBC AUTO: 94 FL (ref 78–100)

## 2025-06-30 PROCEDURE — 258N000003 HC RX IP 258 OP 636: Performed by: OBSTETRICS & GYNECOLOGY

## 2025-06-30 PROCEDURE — 258N000003 HC RX IP 258 OP 636: Performed by: ANESTHESIOLOGY

## 2025-06-30 PROCEDURE — 81025 URINE PREGNANCY TEST: CPT | Performed by: OBSTETRICS & GYNECOLOGY

## 2025-06-30 PROCEDURE — 250N000013 HC RX MED GY IP 250 OP 250 PS 637: Performed by: ANESTHESIOLOGY

## 2025-06-30 PROCEDURE — 36415 COLL VENOUS BLD VENIPUNCTURE: CPT | Performed by: OBSTETRICS & GYNECOLOGY

## 2025-06-30 PROCEDURE — 250N000009 HC RX 250: Performed by: NURSE ANESTHETIST, CERTIFIED REGISTERED

## 2025-06-30 PROCEDURE — C1763 CONN TISS, NON-HUMAN: HCPCS | Performed by: OBSTETRICS & GYNECOLOGY

## 2025-06-30 PROCEDURE — 250N000011 HC RX IP 250 OP 636: Performed by: NURSE ANESTHETIST, CERTIFIED REGISTERED

## 2025-06-30 PROCEDURE — 258N000003 HC RX IP 258 OP 636: Performed by: NURSE ANESTHETIST, CERTIFIED REGISTERED

## 2025-06-30 PROCEDURE — 250N000011 HC RX IP 250 OP 636: Performed by: OBSTETRICS & GYNECOLOGY

## 2025-06-30 PROCEDURE — 250N000013 HC RX MED GY IP 250 OP 250 PS 637: Performed by: OBSTETRICS & GYNECOLOGY

## 2025-06-30 PROCEDURE — 88331 PATH CONSLTJ SURG 1 BLK 1SPC: CPT | Mod: 26

## 2025-06-30 PROCEDURE — 360N000080 HC SURGERY LEVEL 7, PER MIN: Performed by: OBSTETRICS & GYNECOLOGY

## 2025-06-30 PROCEDURE — 272N000001 HC OR GENERAL SUPPLY STERILE: Performed by: OBSTETRICS & GYNECOLOGY

## 2025-06-30 PROCEDURE — 999N000141 HC STATISTIC PRE-PROCEDURE NURSING ASSESSMENT: Performed by: OBSTETRICS & GYNECOLOGY

## 2025-06-30 PROCEDURE — 85018 HEMOGLOBIN: CPT | Performed by: OBSTETRICS & GYNECOLOGY

## 2025-06-30 PROCEDURE — 88305 TISSUE EXAM BY PATHOLOGIST: CPT | Mod: 26 | Performed by: PATHOLOGY

## 2025-06-30 PROCEDURE — 88331 PATH CONSLTJ SURG 1 BLK 1SPC: CPT | Mod: TC | Performed by: OBSTETRICS & GYNECOLOGY

## 2025-06-30 PROCEDURE — 710N000009 HC RECOVERY PHASE 1, LEVEL 1, PER MIN: Performed by: OBSTETRICS & GYNECOLOGY

## 2025-06-30 PROCEDURE — 250N000009 HC RX 250: Performed by: OBSTETRICS & GYNECOLOGY

## 2025-06-30 PROCEDURE — 258N000001 HC RX 258: Performed by: OBSTETRICS & GYNECOLOGY

## 2025-06-30 PROCEDURE — 250N000025 HC SEVOFLURANE, PER MIN: Performed by: OBSTETRICS & GYNECOLOGY

## 2025-06-30 PROCEDURE — C1771 REP DEV, URINARY, W/SLING: HCPCS | Performed by: OBSTETRICS & GYNECOLOGY

## 2025-06-30 PROCEDURE — 370N000017 HC ANESTHESIA TECHNICAL FEE, PER MIN: Performed by: OBSTETRICS & GYNECOLOGY

## 2025-06-30 PROCEDURE — 250N000011 HC RX IP 250 OP 636: Performed by: ANESTHESIOLOGY

## 2025-06-30 DEVICE — SYNTHETIC MESH
Type: IMPLANTABLE DEVICE | Site: PELVIS | Status: FUNCTIONAL
Brand: POLYFORM™

## 2025-06-30 DEVICE — TRANSVAGINAL MID-URETHRAL SLING
Type: IMPLANTABLE DEVICE | Site: VAGINA | Status: FUNCTIONAL
Brand: ADVANTAGE FIT ULTRA SYSTEM

## 2025-06-30 RX ORDER — DEXAMETHASONE SODIUM PHOSPHATE 4 MG/ML
4 INJECTION, SOLUTION INTRA-ARTICULAR; INTRALESIONAL; INTRAMUSCULAR; INTRAVENOUS; SOFT TISSUE
Status: DISCONTINUED | OUTPATIENT
Start: 2025-06-30 | End: 2025-06-30 | Stop reason: HOSPADM

## 2025-06-30 RX ORDER — SODIUM CHLORIDE 9 MG/ML
INJECTION, SOLUTION INTRAVENOUS CONTINUOUS
Status: DISCONTINUED | OUTPATIENT
Start: 2025-06-30 | End: 2025-07-01 | Stop reason: HOSPADM

## 2025-06-30 RX ORDER — OXYCODONE HYDROCHLORIDE 5 MG/1
10 TABLET ORAL
Status: DISCONTINUED | OUTPATIENT
Start: 2025-06-30 | End: 2025-06-30 | Stop reason: HOSPADM

## 2025-06-30 RX ORDER — HYDROMORPHONE HCL IN WATER/PF 6 MG/30 ML
0.2 PATIENT CONTROLLED ANALGESIA SYRINGE INTRAVENOUS EVERY 5 MIN PRN
Status: DISCONTINUED | OUTPATIENT
Start: 2025-06-30 | End: 2025-06-30 | Stop reason: HOSPADM

## 2025-06-30 RX ORDER — LIDOCAINE HYDROCHLORIDE 20 MG/ML
INJECTION, SOLUTION INFILTRATION; PERINEURAL PRN
Status: DISCONTINUED | OUTPATIENT
Start: 2025-06-30 | End: 2025-06-30

## 2025-06-30 RX ORDER — NALOXONE HYDROCHLORIDE 0.4 MG/ML
0.4 INJECTION, SOLUTION INTRAMUSCULAR; INTRAVENOUS; SUBCUTANEOUS
Status: DISCONTINUED | OUTPATIENT
Start: 2025-06-30 | End: 2025-07-01 | Stop reason: HOSPADM

## 2025-06-30 RX ORDER — ONDANSETRON 2 MG/ML
4 INJECTION INTRAMUSCULAR; INTRAVENOUS EVERY 30 MIN PRN
Status: DISCONTINUED | OUTPATIENT
Start: 2025-06-30 | End: 2025-06-30 | Stop reason: HOSPADM

## 2025-06-30 RX ORDER — FENTANYL CITRATE 50 UG/ML
INJECTION, SOLUTION INTRAMUSCULAR; INTRAVENOUS PRN
Status: DISCONTINUED | OUTPATIENT
Start: 2025-06-30 | End: 2025-06-30

## 2025-06-30 RX ORDER — NALOXONE HYDROCHLORIDE 0.4 MG/ML
0.2 INJECTION, SOLUTION INTRAMUSCULAR; INTRAVENOUS; SUBCUTANEOUS
Status: DISCONTINUED | OUTPATIENT
Start: 2025-06-30 | End: 2025-07-01 | Stop reason: HOSPADM

## 2025-06-30 RX ORDER — ACETAMINOPHEN 325 MG/1
975 TABLET ORAL ONCE
Status: DISCONTINUED | OUTPATIENT
Start: 2025-06-30 | End: 2025-06-30

## 2025-06-30 RX ORDER — SODIUM CHLORIDE, SODIUM LACTATE, POTASSIUM CHLORIDE, CALCIUM CHLORIDE 600; 310; 30; 20 MG/100ML; MG/100ML; MG/100ML; MG/100ML
INJECTION, SOLUTION INTRAVENOUS CONTINUOUS PRN
Status: DISCONTINUED | OUTPATIENT
Start: 2025-06-30 | End: 2025-06-30

## 2025-06-30 RX ORDER — NALOXONE HYDROCHLORIDE 0.4 MG/ML
0.1 INJECTION, SOLUTION INTRAMUSCULAR; INTRAVENOUS; SUBCUTANEOUS
Status: DISCONTINUED | OUTPATIENT
Start: 2025-06-30 | End: 2025-06-30 | Stop reason: HOSPADM

## 2025-06-30 RX ORDER — FENTANYL CITRATE 50 UG/ML
25 INJECTION, SOLUTION INTRAMUSCULAR; INTRAVENOUS EVERY 5 MIN PRN
Status: DISCONTINUED | OUTPATIENT
Start: 2025-06-30 | End: 2025-06-30 | Stop reason: HOSPADM

## 2025-06-30 RX ORDER — CEFAZOLIN SODIUM/WATER 2 G/20 ML
2 SYRINGE (ML) INTRAVENOUS
Status: COMPLETED | OUTPATIENT
Start: 2025-06-30 | End: 2025-06-30

## 2025-06-30 RX ORDER — SODIUM CHLORIDE, SODIUM LACTATE, POTASSIUM CHLORIDE, CALCIUM CHLORIDE 600; 310; 30; 20 MG/100ML; MG/100ML; MG/100ML; MG/100ML
INJECTION, SOLUTION INTRAVENOUS CONTINUOUS
Status: DISCONTINUED | OUTPATIENT
Start: 2025-06-30 | End: 2025-06-30 | Stop reason: HOSPADM

## 2025-06-30 RX ORDER — ONDANSETRON 4 MG/1
4 TABLET, ORALLY DISINTEGRATING ORAL EVERY 30 MIN PRN
Status: DISCONTINUED | OUTPATIENT
Start: 2025-06-30 | End: 2025-06-30 | Stop reason: HOSPADM

## 2025-06-30 RX ORDER — PIPERACILLIN SODIUM, TAZOBACTAM SODIUM 3; .375 G/15ML; G/15ML
3.38 INJECTION, POWDER, LYOPHILIZED, FOR SOLUTION INTRAVENOUS EVERY 6 HOURS
Status: COMPLETED | OUTPATIENT
Start: 2025-06-30 | End: 2025-07-01

## 2025-06-30 RX ORDER — KETOROLAC TROMETHAMINE 30 MG/ML
30 INJECTION, SOLUTION INTRAMUSCULAR; INTRAVENOUS EVERY 6 HOURS
Status: COMPLETED | OUTPATIENT
Start: 2025-06-30 | End: 2025-07-01

## 2025-06-30 RX ORDER — ONDANSETRON 4 MG/1
4 TABLET, ORALLY DISINTEGRATING ORAL EVERY 6 HOURS PRN
Status: DISCONTINUED | OUTPATIENT
Start: 2025-06-30 | End: 2025-07-01 | Stop reason: HOSPADM

## 2025-06-30 RX ORDER — ACETAMINOPHEN 325 MG/1
975 TABLET ORAL ONCE
Status: COMPLETED | OUTPATIENT
Start: 2025-06-30 | End: 2025-06-30

## 2025-06-30 RX ORDER — CEFAZOLIN SODIUM/WATER 2 G/20 ML
2 SYRINGE (ML) INTRAVENOUS
Status: DISCONTINUED | OUTPATIENT
Start: 2025-06-30 | End: 2025-06-30

## 2025-06-30 RX ORDER — PROPOFOL 10 MG/ML
INJECTION, EMULSION INTRAVENOUS PRN
Status: DISCONTINUED | OUTPATIENT
Start: 2025-06-30 | End: 2025-06-30

## 2025-06-30 RX ORDER — CEFAZOLIN SODIUM/WATER 2 G/20 ML
2 SYRINGE (ML) INTRAVENOUS SEE ADMIN INSTRUCTIONS
Status: DISCONTINUED | OUTPATIENT
Start: 2025-06-30 | End: 2025-06-30

## 2025-06-30 RX ORDER — HYDROMORPHONE HCL IN WATER/PF 6 MG/30 ML
0.4 PATIENT CONTROLLED ANALGESIA SYRINGE INTRAVENOUS
Status: DISCONTINUED | OUTPATIENT
Start: 2025-06-30 | End: 2025-07-01 | Stop reason: HOSPADM

## 2025-06-30 RX ORDER — CEFAZOLIN SODIUM/WATER 2 G/20 ML
2 SYRINGE (ML) INTRAVENOUS SEE ADMIN INSTRUCTIONS
Status: DISCONTINUED | OUTPATIENT
Start: 2025-06-30 | End: 2025-06-30 | Stop reason: HOSPADM

## 2025-06-30 RX ORDER — SIMETHICONE 80 MG
80 TABLET,CHEWABLE ORAL EVERY 6 HOURS PRN
Status: DISCONTINUED | OUTPATIENT
Start: 2025-06-30 | End: 2025-07-01 | Stop reason: HOSPADM

## 2025-06-30 RX ORDER — FENTANYL CITRATE 50 UG/ML
50 INJECTION, SOLUTION INTRAMUSCULAR; INTRAVENOUS EVERY 5 MIN PRN
Status: DISCONTINUED | OUTPATIENT
Start: 2025-06-30 | End: 2025-06-30 | Stop reason: HOSPADM

## 2025-06-30 RX ORDER — DEXAMETHASONE SODIUM PHOSPHATE 4 MG/ML
INJECTION, SOLUTION INTRA-ARTICULAR; INTRALESIONAL; INTRAMUSCULAR; INTRAVENOUS; SOFT TISSUE PRN
Status: DISCONTINUED | OUTPATIENT
Start: 2025-06-30 | End: 2025-06-30

## 2025-06-30 RX ORDER — DILTIAZEM HYDROCHLORIDE 120 MG/1
120 CAPSULE, COATED, EXTENDED RELEASE ORAL DAILY
Status: DISCONTINUED | OUTPATIENT
Start: 2025-07-01 | End: 2025-07-01 | Stop reason: HOSPADM

## 2025-06-30 RX ORDER — HYDROMORPHONE HYDROCHLORIDE 2 MG/1
4 TABLET ORAL EVERY 4 HOURS PRN
Status: DISCONTINUED | OUTPATIENT
Start: 2025-06-30 | End: 2025-07-01 | Stop reason: HOSPADM

## 2025-06-30 RX ORDER — HYDROMORPHONE HCL IN WATER/PF 6 MG/30 ML
0.4 PATIENT CONTROLLED ANALGESIA SYRINGE INTRAVENOUS EVERY 5 MIN PRN
Status: DISCONTINUED | OUTPATIENT
Start: 2025-06-30 | End: 2025-06-30 | Stop reason: HOSPADM

## 2025-06-30 RX ORDER — ONDANSETRON 2 MG/ML
INJECTION INTRAMUSCULAR; INTRAVENOUS PRN
Status: DISCONTINUED | OUTPATIENT
Start: 2025-06-30 | End: 2025-06-30

## 2025-06-30 RX ORDER — HYDROMORPHONE HCL IN WATER/PF 6 MG/30 ML
0.2 PATIENT CONTROLLED ANALGESIA SYRINGE INTRAVENOUS
Status: DISCONTINUED | OUTPATIENT
Start: 2025-06-30 | End: 2025-07-01 | Stop reason: HOSPADM

## 2025-06-30 RX ORDER — GLYCOPYRROLATE 0.2 MG/ML
INJECTION, SOLUTION INTRAMUSCULAR; INTRAVENOUS PRN
Status: DISCONTINUED | OUTPATIENT
Start: 2025-06-30 | End: 2025-06-30

## 2025-06-30 RX ORDER — OXYCODONE HYDROCHLORIDE 5 MG/1
5 TABLET ORAL
Status: COMPLETED | OUTPATIENT
Start: 2025-06-30 | End: 2025-06-30

## 2025-06-30 RX ORDER — ACETAMINOPHEN 325 MG/1
650 TABLET ORAL EVERY 6 HOURS
Status: DISCONTINUED | OUTPATIENT
Start: 2025-06-30 | End: 2025-07-01 | Stop reason: HOSPADM

## 2025-06-30 RX ORDER — HYDROMORPHONE HYDROCHLORIDE 2 MG/1
2 TABLET ORAL EVERY 4 HOURS PRN
Status: DISCONTINUED | OUTPATIENT
Start: 2025-06-30 | End: 2025-07-01 | Stop reason: HOSPADM

## 2025-06-30 RX ORDER — PHENAZOPYRIDINE HYDROCHLORIDE 200 MG/1
200 TABLET, FILM COATED ORAL ONCE
Status: COMPLETED | OUTPATIENT
Start: 2025-06-30 | End: 2025-06-30

## 2025-06-30 RX ORDER — PHENAZOPYRIDINE HYDROCHLORIDE 200 MG/1
200 TABLET, FILM COATED ORAL ONCE
Status: DISCONTINUED | OUTPATIENT
Start: 2025-06-30 | End: 2025-06-30

## 2025-06-30 RX ORDER — ONDANSETRON 2 MG/ML
4 INJECTION INTRAMUSCULAR; INTRAVENOUS EVERY 6 HOURS PRN
Status: DISCONTINUED | OUTPATIENT
Start: 2025-06-30 | End: 2025-07-01 | Stop reason: HOSPADM

## 2025-06-30 RX ORDER — POLYETHYLENE GLYCOL 3350 17 G/17G
17 POWDER, FOR SOLUTION ORAL DAILY
Status: DISCONTINUED | OUTPATIENT
Start: 2025-06-30 | End: 2025-07-01 | Stop reason: HOSPADM

## 2025-06-30 RX ORDER — PROCHLORPERAZINE MALEATE 10 MG
10 TABLET ORAL EVERY 6 HOURS PRN
Status: DISCONTINUED | OUTPATIENT
Start: 2025-06-30 | End: 2025-07-01 | Stop reason: HOSPADM

## 2025-06-30 RX ORDER — PROPOFOL 10 MG/ML
INJECTION, EMULSION INTRAVENOUS CONTINUOUS PRN
Status: DISCONTINUED | OUTPATIENT
Start: 2025-06-30 | End: 2025-06-30

## 2025-06-30 RX ADMIN — SODIUM CHLORIDE, SODIUM LACTATE, POTASSIUM CHLORIDE, AND CALCIUM CHLORIDE: .6; .31; .03; .02 INJECTION, SOLUTION INTRAVENOUS at 12:04

## 2025-06-30 RX ADMIN — Medication 2 G: at 12:04

## 2025-06-30 RX ADMIN — PHENAZOPYRIDINE 200 MG: 200 TABLET ORAL at 10:52

## 2025-06-30 RX ADMIN — ROCURONIUM BROMIDE 50 MG: 50 INJECTION, SOLUTION INTRAVENOUS at 12:11

## 2025-06-30 RX ADMIN — KETOROLAC TROMETHAMINE 30 MG: 30 INJECTION, SOLUTION INTRAMUSCULAR at 16:22

## 2025-06-30 RX ADMIN — ACETAMINOPHEN 975 MG: 325 TABLET ORAL at 10:50

## 2025-06-30 RX ADMIN — KETOROLAC TROMETHAMINE 30 MG: 30 INJECTION, SOLUTION INTRAMUSCULAR at 22:22

## 2025-06-30 RX ADMIN — SODIUM CHLORIDE, SODIUM LACTATE, POTASSIUM CHLORIDE, AND CALCIUM CHLORIDE: .6; .31; .03; .02 INJECTION, SOLUTION INTRAVENOUS at 11:00

## 2025-06-30 RX ADMIN — FENTANYL CITRATE 100 MCG: 50 INJECTION INTRAMUSCULAR; INTRAVENOUS at 12:11

## 2025-06-30 RX ADMIN — SODIUM CHLORIDE: 0.9 INJECTION, SOLUTION INTRAVENOUS at 17:56

## 2025-06-30 RX ADMIN — HYDROMORPHONE HYDROCHLORIDE 0.5 MG: 1 INJECTION, SOLUTION INTRAMUSCULAR; INTRAVENOUS; SUBCUTANEOUS at 13:12

## 2025-06-30 RX ADMIN — DEXAMETHASONE SODIUM PHOSPHATE 4 MG: 4 INJECTION, SOLUTION INTRA-ARTICULAR; INTRALESIONAL; INTRAMUSCULAR; INTRAVENOUS; SOFT TISSUE at 13:59

## 2025-06-30 RX ADMIN — ACETAMINOPHEN 650 MG: 325 TABLET ORAL at 18:06

## 2025-06-30 RX ADMIN — OXYCODONE HYDROCHLORIDE 5 MG: 5 TABLET ORAL at 17:15

## 2025-06-30 RX ADMIN — LIDOCAINE HYDROCHLORIDE 30 MG: 20 INJECTION, SOLUTION INFILTRATION; PERINEURAL at 12:11

## 2025-06-30 RX ADMIN — SODIUM CHLORIDE, SODIUM LACTATE, POTASSIUM CHLORIDE, AND CALCIUM CHLORIDE: .6; .31; .03; .02 INJECTION, SOLUTION INTRAVENOUS at 13:29

## 2025-06-30 RX ADMIN — MIDAZOLAM 2 MG: 1 INJECTION INTRAMUSCULAR; INTRAVENOUS at 12:04

## 2025-06-30 RX ADMIN — SUGAMMADEX 200 MG: 100 INJECTION, SOLUTION INTRAVENOUS at 15:44

## 2025-06-30 RX ADMIN — PROPOFOL 50 MCG/KG/MIN: 10 INJECTION, EMULSION INTRAVENOUS at 12:21

## 2025-06-30 RX ADMIN — FENTANYL CITRATE 25 MCG: 50 INJECTION, SOLUTION INTRAMUSCULAR; INTRAVENOUS at 16:20

## 2025-06-30 RX ADMIN — DEXAMETHASONE SODIUM PHOSPHATE 4 MG: 4 INJECTION, SOLUTION INTRA-ARTICULAR; INTRALESIONAL; INTRAMUSCULAR; INTRAVENOUS; SOFT TISSUE at 12:11

## 2025-06-30 RX ADMIN — ROCURONIUM BROMIDE 20 MG: 50 INJECTION, SOLUTION INTRAVENOUS at 12:58

## 2025-06-30 RX ADMIN — PIPERACILLIN AND TAZOBACTAM 3.38 G: 3; .375 INJECTION, POWDER, FOR SOLUTION INTRAVENOUS at 18:06

## 2025-06-30 RX ADMIN — ONDANSETRON 4 MG: 2 INJECTION INTRAMUSCULAR; INTRAVENOUS at 13:59

## 2025-06-30 RX ADMIN — SODIUM CHLORIDE, SODIUM LACTATE, POTASSIUM CHLORIDE, AND CALCIUM CHLORIDE: .6; .31; .03; .02 INJECTION, SOLUTION INTRAVENOUS at 16:25

## 2025-06-30 RX ADMIN — HYDROMORPHONE HYDROCHLORIDE 2 MG: 2 TABLET ORAL at 18:11

## 2025-06-30 RX ADMIN — HYDROMORPHONE HYDROCHLORIDE 0.5 MG: 1 INJECTION, SOLUTION INTRAMUSCULAR; INTRAVENOUS; SUBCUTANEOUS at 12:41

## 2025-06-30 RX ADMIN — PROPOFOL 200 MG: 10 INJECTION, EMULSION INTRAVENOUS at 12:11

## 2025-06-30 RX ADMIN — GLYCOPYRROLATE 0.2 MG: 0.2 INJECTION, SOLUTION INTRAMUSCULAR; INTRAVENOUS at 12:11

## 2025-06-30 RX ADMIN — ROCURONIUM BROMIDE 30 MG: 50 INJECTION, SOLUTION INTRAVENOUS at 13:57

## 2025-06-30 ASSESSMENT — ACTIVITIES OF DAILY LIVING (ADL)
ADLS_ACUITY_SCORE: 28
ADLS_ACUITY_SCORE: 30
ADLS_ACUITY_SCORE: 31
ADLS_ACUITY_SCORE: 28
ADLS_ACUITY_SCORE: 31
ADLS_ACUITY_SCORE: 28
ADLS_ACUITY_SCORE: 31
ADLS_ACUITY_SCORE: 28
ADLS_ACUITY_SCORE: 31
ADLS_ACUITY_SCORE: 28
ADLS_ACUITY_SCORE: 30
ADLS_ACUITY_SCORE: 31
ADLS_ACUITY_SCORE: 28

## 2025-06-30 NOTE — ANESTHESIA CARE TRANSFER NOTE
Patient: Cora Connell    Procedure: Procedure(s):  Robotic laparoscopic sacral colpopexy, supracervical hysterectomy, bilateral ovarian cystectomy, abdominal enterocele repair, midurethral sling, cystoscopy       Diagnosis: Uterovaginal prolapse, incomplete [N81.2]  Female stress incontinence [N39.3]  Incomplete defecation [R15.0]  Rectocele [N81.6]  Enterocele [K46.9]  Diagnosis Additional Information: No value filed.    Anesthesia Type:   General     Note:    Oropharynx: oropharynx clear of all foreign objects and spontaneously breathing  Level of Consciousness: drowsy  Oxygen Supplementation: face mask  Level of Supplemental Oxygen (L/min / FiO2): 5  Independent Airway: airway patency satisfactory and stable  Dentition: dentition unchanged  Vital Signs Stable: post-procedure vital signs reviewed and stable  Report to RN Given: handoff report given  Patient transferred to: PACU    Handoff Report: Identifed the Patient, Identified the Reponsible Provider, Reviewed the pertinent medical history, Discussed the surgical course, Reviewed Intra-OP anesthesia mangement and issues during anesthesia, Set expectations for post-procedure period and Allowed opportunity for questions and acknowledgement of understanding      Vitals:  Vitals Value Taken Time   /72 06/30/25 15:55   Temp 96.8  F (36  C) 06/30/25 15:56   Pulse 89 06/30/25 15:56   Resp 19 06/30/25 15:56   SpO2 96 % 06/30/25 15:56   Vitals shown include unfiled device data.    Electronically Signed By: KARMEN Zamudio CRNA  June 30, 2025  3:58 PM

## 2025-06-30 NOTE — OP NOTE
OPERATIVE REPORT    NAME: Cora Connell  MR#: [id number]  : 1980    DATE OF OPERATION: 2025    SURGEON: Yaz Dimas MD    PREOPERATIVE DIAGNOSES:  1. Incomplete uterovaginal prolapse. Stage 2 POP-Q Aa 0, Ap 0, C -2, TVL 8  2. Associated cystocele, rectocele and enterocele  3. Stress urinary incontinence.    POSTOPERATIVE DIAGNOSES:  1. Incomplete uterovaginal prolapse. Stage 2 POP-Q Aa 0, Ap 0, C -2, TVL 8  2. Associated cystocele, rectocele and enterocele  3. Stress urinary incontinence.  4. Bilateral ovarian cysts  5. Endometriosis implants    PROCEDURE:  1. Da Chavo laparoscopic sacral colpopexy  2. Da Chavo laparoscopic supracervical hysterectomy  3. Da Chavo laparoscopic bilateral ovarian cystectomies  4. Da Chavo laparoscopic abdominal anterior, posterior and enterocele repairs  5. Retropubic midurethral sling  6. Cystourethroscopy    ASSISTANT:  A skilled first assistant, ANITA Dickson, was necessary for this procedure for assistance with patient positioning, prepping, draping, surgical visualization, performance of the repairs, wound closure and dressing application. The assistant was present for the entire procedure.    ANESTHESIA:  General endotracheal.    ESTIMATED BLOOD LOSS:  25 ml    IV FLUIDS:  Per anesthesia ml crystalloid    FINDINGS:  1. The bladder was found to be free of lesion. Ureters were in their normal anatomic positions, were noted to be patent via administration of dye.  2. The left ovary had a purple hued cyst 4 cm and straw colored cyst 2 cm, both removed intact and sent for frozen section- benign lutinized ovarian tissue was the frozen read.  3. The right ovary had a fluid filled ovarian cyst with straw colored fluid 2 cm, and some small 0.5 cm cysts with yellow thick substance inside.  Ultimately the cysts were removed, small remnant ovarian tissue was maintained bilaterally.  4. The fallopian tubes were absent  5. Endometrial implants- 3 mm on the  right uterosacral ligament and the deep pelvic peritoneum. These were ablated with cautery.  6. Normal anorectal examination at the conclusion of the procedure.    DRAINS:  16-Kyrgyz Montilla catheter to gravity drainage.    PACKING:  Saline-soaked vaginal packing placed.    COMPLICATIONS:  None.    INDICATIONS :  This patient was seen in consultation regarding uterovaginal prolapse and urinary incontinence . Please refer to her clinic documentation for a complete description of her evaluation and treatment plan. She was desirous of a definitive surgical approach. Prior to the procedure the risks, benefits, indications, and alternatives were discussed. Written and verbal consent were obtained.    PROCEDURE IN DETAIL:  The patient was brought to the operating suite. She was administered prophylactic IV antibiotics, had sequential compression devices present and functioning on her lower extremities.    She was placed in a supine position, administered general endotracheal anesthesia without complication. She was now carefully positioned in the dorsal lithotomy position with her legs carefully stationed in Yellofin stirrups, with attention to avoiding pressure points. An exam under anesthesia was performed with noted relaxation, no adnexal or parametrial masses, normal anorectal exam. She was now sterilely prepped and draped both abdominally and vaginally, and an 16-Kyrgyz Montilla catheter was placed to gravity drainage.    Laparoscopic entry:  At the base of the umbilicus, a skin incision was created. The incision was extended to 25- 30 mm and a dissection was performed down to the peritoneum and entry into the abdominal cavity was achieved. The gelpoint retractor/trocar hemphill was inserted. Inspection of the intra-abdominal cavity showed there to be no lesions. She was placed in steep Trendelenburg position and 3 additional laparoscopic ports were placed, 8 mm far right quadrant, 8 mm mid left quadrant, and 8 mm far left  quadrant. The da Chavo robotic arms were brought to the patient's bedside and operative control was assumed at the console.    Bilateral ovarian cystectomies:  The left ovary had a purple hued cyst 4 cm and straw colored cyst 2 cm, both were removed using sharp and electric dissection, then placed in an endocatch sac intact and sent for frozen section- benign lutinized ovarian tissue was the frozen read. The right ovary had a fluid filled ovarian cyst with straw colored fluid 2 cm, and some small 0.5 cm cysts with yellow thick substance inside. These were both removed.  Ultimately the cysts were removed, small remnant ovarian tissue was maintained bilaterally. The fallopian tubes were absent.    Supracervical hysterectomy:  The left round ligament was grasped, cauterized, and incised. The anterior broad ligament was then scored opened. The utero-ovarian pedicle was now cauterized and incised. The broad ligament was further dissected using cautery. The uterine vessels were visualized and cauterized. Anteriorly, a peritoneal bladder flap had been developed transversely and dissected down past the external cervical os. This procedure was then repeated in a similar fashion on the patient's right side. At this point, the specimen was truncated from the residual cervix leaving 1-2 cm of residual cervical tissue. The specimen was tagged for later removal.    Sacral colpopexy:  A Lucite probe was placed within the vaginal canal. Anteriorly, the bladder was dissected down the anterior vaginal muscularis approximately 9 cm sagitally. Posteriorly, the peritoneum was dissected off the posterior rectovaginal septum and dissected down to the rectovaginal septum, approximately 11 cm sagitally, as close to the perineal body as possible.    Sacral dissection:  At the sacral promontory the right ureter was noted to be lateral to the area of dissection. The peritoneum was elevated and incised. The peritoneal incision was taken down  around the pelvic curvature to meet up with the posterior vaginal dissection. The peritoneal edges were carefully mobilized for future closure. At the sacral promontory the connective tissue was dissected down to the anterior longitudinal ligament. The middle sacral vessel was cauterized.    Anterior and Posterior Repairs:  The posterior perirectal tissue and vaginal muscularis were cinched using several longitudinal placed 2-0 PDS sutures, performing an internal vaginal rectocele repair.  The anterior perivesical tissue and vaginal muscularis were cinched using several longitudinal placed sutures, performing an internal vaginal cystocele repair.    Mesh Attachment.  A 5 cm x 20 cm piece of [place] Polyform polypropylene mesh was attached to the anterior vaginal muscularis using approximately 9 interrupted sutures of 2-0 PDS. An identical sheet of mesh was attached to the posterior vaginal wall using approximately 9 interrupted sutures of 2-0 PDS. The long arms of the mesh were now brought to the sacral promontory and attached to the anterior longitudinal ligament using 3 interrupted sutures of 0 Artemus-Antelmo. Appropriate tensioning was ascertained using visual and palpating clues. Redundant longitudinal mesh was trimmed and the resultant peritoneum was closed with monocryl suture, thus retroperitonealizing the mesh repair.    Abdominal Enterocele Repair  Using a Halban technique, the deep pelvis was closed/obliterated using 2-0 PDS suture, imbricating the peritoneal tissue.    Cystourethroscopy was now performed, which noted patent ureters bilaterally and normal appearing bladder.    Specimen removal:  The uterine/adnexal specimen was now removed from the abdominal cavity through the umbilical incision using an endo-catch bag.    Laparoscopic closure:  The umbilical fascia was closed with 0-PDS suture. The CO2 gas was allowed to escape from the patient's abdomen, and the resultant 4 skin incisions were closed  with a subcuticular suture of 4-0 vicryl and skin glue was applied.    Retropubic midurethral sling:  Two marks were created on the anterior abdominal wall 2.5 cm lateral to midline at the level of the pubic bone. Attention was turned to the vagina, where an Allis clamp was applied 1 cm distal from the meatus, an additional Allis clamp 2.5 cm from the meatus. Periurethral tissue was injected with a solution of Marcaine with epinephrine. A 1.5 cm incision was created between the 2 Allis clamps beneath the mid urethra in the sagittal plane. Two periurethral tunnels were then created out laterally towards the pubic bone. Once an adequate dissection had been performed, the Inherited Health Advantage Fit device was selected and loaded. Trocar was brought through the patient's left periurethral tunnel back behind the pubic bone, through the space of Retzius, and out through the previously created freedom on the anterior abdominal wall. The blue stay sheath was left in place.    This was repeated in a similar fashion on the patient's right side. The urethra was diverted in ipsilateral fashion during trocar placement. Cystourethroscopy was now performed with the above noted normal findings. The cystoscope was removed. The sling material was appropriately positioned beneath the mid urethra with no overt tension. The resultant incision was closed with a running locking suture of 2-0 Vicryl. Excess sling material on the anterior abdominal wall was trimmed. The resultant incisions were closed with Dermabond.    The vagina was packed with a saline-soaked vaginal packing. She had a 16-Sami Montilla catheter present to gravity drainage. Sponge, lap, and needle counts were found to be correct. There were no complications from surgery. Patient was awoken from anesthesia, and brought to the recovery room in excellent condition.    Yaz Dimas MD

## 2025-06-30 NOTE — ANESTHESIA POSTPROCEDURE EVALUATION
Patient: Cora Connell    Procedure: Procedure(s):  Robotic laparoscopic sacral colpopexy, supracervical hysterectomy, bilateral ovarian cystectomy, abdominal enterocele repair, midurethral sling, cystoscopy       Anesthesia Type:  General    Note:  Disposition: Outpatient   Postop Pain Control: Uneventful            Sign Out: Well controlled pain   PONV: No   Neuro/Psych: Uneventful            Sign Out: Acceptable/Baseline neuro status   Airway/Respiratory: Uneventful            Sign Out: Acceptable/Baseline resp. status   CV/Hemodynamics: Uneventful            Sign Out: Acceptable CV status; No obvious hypovolemia; No obvious fluid overload   Other NRE: NONE   DID A NON-ROUTINE EVENT OCCUR? No           Last vitals:  Vitals Value Taken Time   /73 06/30/25 16:15   Temp 97.34  F (36.3  C) 06/30/25 16:28   Pulse 87 06/30/25 16:28   Resp 14 06/30/25 16:28   SpO2 93 % 06/30/25 16:28   Vitals shown include unfiled device data.    Electronically Signed By: Gilbert Samuels MD  June 30, 2025  4:30 PM

## 2025-06-30 NOTE — ANESTHESIA PREPROCEDURE EVALUATION
Anesthesia Pre-Procedure Evaluation    Patient: Cora Connell   MRN: [id number] : 1980          Procedure : Procedure(s):  robotic laparoscopic sacral colpopexy, robotic laparoscopic supracervical hysterectomy, robotic laparoscopic bilateral salpingectomy, possible laparoscopic bilateral oophorectomy, robotic laparoscopic abdominal enterocele repair, possible midurethral sling, cystoscopy, possible posterior repair         Past Medical History:   Diagnosis Date     Abnormal Pap smear     LEEP     Arrhythmia      Depressive disorder      Irregular heart beat      Postpartum depression      PVC's (premature ventricular contractions)      Varicosities     LE      Past Surgical History:   Procedure Laterality Date     EP ABLATION PVC N/A 2024    Procedure: Ablation Premature Ventricular Contractions;  Surgeon: Yosvany Mata MD;  Location:  [facility]     EXCISE LESION UPPER EXTREMITY  2014    Procedure: EXCISE LESION UPPER EXTREMITY;  EXCISION OF LEFT SHOULDER MASS ;  Surgeon: Nena Zavaleta MD;  Location: [facility] REMOVAL OF TONSILS,<11 Y/O      5 years of age     LAPAROSCOPIC CYSTECTOMY OVARIAN (BENIGN) Right 2018    Procedure: LAPAROSCOPIC CYSTECTOMY OVARIAN (BENIGN);;  Surgeon: Latasha Arceo MD;  Location:  OR     LAPAROSCOPY DIAGNOSTIC (GYN) Right 2018    Procedure: LAPAROSCOPY DIAGNOSTIC (GYN);  DIAGNOSTIC LAPAROSCOPY, RIGHT OVARIAN CYSTECTOMY.;  Surgeon: Latasha Arceo MD;  Location:  OR     LEEP TX, CERVICAL        No Known Allergies   Social History     Tobacco Use     Smoking status: Never     Smokeless tobacco: Never   Substance Use Topics     Alcohol use: Yes     Comment: occasional.      Wt Readings from Last 1 Encounters:   25 81.3 kg (179 lb 4.8 oz)        Anesthesia Evaluation   Pt has had prior anesthetic. Type: General.    No history of anesthetic complications       ROS/MED HX  ENT/Pulmonary:  - neg pulmonary ROS    "  Neurologic:  - neg neurologic ROS     Cardiovascular:  - neg cardiovascular ROS   (+)  - -   -  - -                          Irregular Heartbeat/Palpitations,            METS/Exercise Tolerance:     Hematologic:  - neg hematologic  ROS     Musculoskeletal:  - neg musculoskeletal ROS     GI/Hepatic:  - neg GI/hepatic ROS     Renal/Genitourinary:  - neg Renal ROS     Endo:     (+)               Obesity,       Psychiatric/Substance Use:  - neg psychiatric ROS     Infectious Disease:  - neg infectious disease ROS     Malignancy:       Other:              Physical Exam  Airway  Mallampati: II  TM distance: >3 FB  Neck ROM: full    Cardiovascular - normal exam   Dental   (+) Minor Abnormalities - some fillings, tiny chips      Pulmonary - normal exam      Neurological - normal exam  She appears awake, alert and oriented x3.    Other Findings       OUTSIDE LABS:  CBC:   Lab Results   Component Value Date    WBC 10.2 12/27/2024    WBC 6.6 08/22/2024    HGB 14.3 12/27/2024    HGB 14.7 08/22/2024    HCT 39.9 12/27/2024    HCT 41.3 08/22/2024     12/27/2024     08/22/2024     BMP:   Lab Results   Component Value Date     12/27/2024     08/22/2024    POTASSIUM 4.0 12/27/2024    POTASSIUM 3.8 08/22/2024    CHLORIDE 102 12/27/2024    CHLORIDE 100 08/22/2024    CO2 23 12/27/2024    CO2 25 08/22/2024    BUN 12.4 12/27/2024    BUN 10.9 08/22/2024    CR 0.70 12/27/2024    CR 0.69 08/22/2024     (H) 12/27/2024    GLC 92 08/22/2024     COAGS: No results found for: \"PTT\", \"INR\", \"FIBR\"  POC:   Lab Results   Component Value Date    HCG Negative 06/30/2025    HCGS Negative 08/22/2024     HEPATIC:   Lab Results   Component Value Date    ALBUMIN 4.8 08/22/2024    PROTTOTAL 7.6 08/22/2024    ALT 14 08/22/2024    AST 15 08/22/2024    ALKPHOS 52 08/22/2024    BILITOTAL 0.4 08/22/2024     OTHER:   Lab Results   Component Value Date    RACIEL 9.2 12/27/2024    MAG 1.9 08/22/2024    LIPASE 124 06/25/2019    TSH " "1.76 08/22/2024       Anesthesia Plan    ASA Status:  2       Anesthesia Type: General.  Airway: oral.  Induction: intravenous.  Maintenance: Inhalation.   Techniques and Equipment:       - Monitoring Plan: standard ASA monitoring     Consents    Anesthesia Plan(s) and associated risks, benefits, and realistic alternatives discussed. Questions answered and patient/representative(s) expressed understanding.     - Discussed: anesthesiologist     - Discussed with:  Patient               Postoperative Care    Pain management: multimodal analgesia.     Comments:                 Gilbert Samuels MD    I have reviewed the pertinent notes and labs in the chart from the past 30 days and (re)examined the patient.  Any updates or changes from those notes are reflected in this note.    Clinically Significant Risk Factors Present on Admission                             # Obesity: Estimated body mass index is 30.78 kg/m  as calculated from the following:    Height as of this encounter: 1.626 m (5' 4\").    Weight as of this encounter: 81.3 kg (179 lb 4.8 oz).                    "

## 2025-06-30 NOTE — ANESTHESIA PROCEDURE NOTES
Airway       Patient location during procedure: OR       Procedure Start/Stop Times: 6/30/2025 12:13 PM  Staff -        Anesthesiologist:  Mima Larry APRN CRNA       Performed By: CRNA  Consent for Airway        Urgency: elective  Indications and Patient Condition       Indications for airway management: jewell-procedural       Induction type:intravenous       Mask difficulty assessment: 1 - vent by mask    Final Airway Details       Final airway type: endotracheal airway       Successful airway: ETT - single  Endotracheal Airway Details        ETT size (mm): 7.0       Cuffed: yes       Successful intubation technique: direct laryngoscopy       DL Blade Type: [place] 2       Grade View of Cords: 1       Adjucts: stylet       Position: Right       Bite block used: Oral Airway    Post intubation assessment        Placement verified by: capnometry and chest rise        Number of attempts at approach: 1       Secured with: tape       Ease of procedure: easy       Dentition: Intact    Medication(s) Administered   Medication Administration Time: 6/30/2025 12:13 PM

## 2025-06-30 NOTE — PLAN OF CARE
"Goal Outcome Evaluation:      Plan of Care Reviewed With: patient    Overall Patient Progress: improvingOverall Patient Progress: improving         Pt transferred from PACU at 1740 via cart . Pt able to walk to bed safely with assist .  Oriented to unit. Call light within reach. Denies nausea, no vomiting. Diet advanced to regular .  Tylenol and Dilaudid given for incisional pain and cramping. Incision clean, dry and intact .       Problem: Adult Inpatient Plan of Care  Goal: Plan of Care Review  Description: The Plan of Care Review/Shift note should be completed every shift.  The Outcome Evaluation is a brief statement about your assessment that the patient is improving, declining, or no change.  This information will be displayed automatically on your shift  note.  Outcome: Progressing  Flowsheets (Taken 6/30/2025 1832)  Plan of Care Reviewed With: patient  Overall Patient Progress: improving  Goal: Patient-Specific Goal (Individualized)  Description: You can add care plan individualizations to a care plan. Examples of Individualization might be:  \"Parent requests to be called daily at 9am for status\", \"I have a hard time hearing out of my right ear\", or \"Do not touch me to wake me up as it startles  me\".  Outcome: Progressing  Goal: Absence of Hospital-Acquired Illness or Injury  Outcome: Progressing  Intervention: Prevent Skin Injury  Recent Flowsheet Documentation  Taken 6/30/2025 1812 by Griselda Leigh RN  Body Position: position changed independently  Intervention: Prevent and Manage VTE (Venous Thromboembolism) Risk  Recent Flowsheet Documentation  Taken 6/30/2025 1812 by Griselda Leigh RN  VTE Prevention/Management: SCDs on (sequential compression devices)  Goal: Optimal Comfort and Wellbeing  Outcome: Progressing  Intervention: Monitor Pain and Promote Comfort  Recent Flowsheet Documentation  Taken 6/30/2025 1812 by Griselda Leigh, RN  Pain Management Interventions: medication (see " MAR)  Intervention: Provide Person-Centered Care  Recent Flowsheet Documentation  Taken 6/30/2025 1812 by Griselda Leigh, RN  Trust Relationship/Rapport: care explained  Goal: Readiness for Transition of Care  Outcome: Progressing  Intervention: Mutually Develop Transition Plan  Recent Flowsheet Documentation  Taken 6/30/2025 1800 by Griselda Leigh, RN  Equipment Currently Used at Home: none     Problem: Skin Injury Risk Increased  Goal: Skin Health and Integrity  Outcome: Progressing  Intervention: Optimize Skin Protection  Recent Flowsheet Documentation  Taken 6/30/2025 1812 by Griselda Leigh, RN  Activity Management: activity adjusted per tolerance  Head of Bed (HOB) Positioning: HOB at 30-45 degrees

## 2025-07-01 ENCOUNTER — HOSPITAL ENCOUNTER (OUTPATIENT)
Facility: CLINIC | Age: 45
End: 2025-07-01
Admitting: OBSTETRICS & GYNECOLOGY
Payer: COMMERCIAL

## 2025-07-01 VITALS
RESPIRATION RATE: 16 BRPM | TEMPERATURE: 98 F | WEIGHT: 179.3 LBS | OXYGEN SATURATION: 98 % | BODY MASS INDEX: 30.61 KG/M2 | SYSTOLIC BLOOD PRESSURE: 125 MMHG | HEART RATE: 77 BPM | HEIGHT: 64 IN | DIASTOLIC BLOOD PRESSURE: 80 MMHG

## 2025-07-01 LAB
HGB BLD-MCNC: 12.8 G/DL (ref 11.7–15.7)
MCV RBC AUTO: 95 FL (ref 78–100)

## 2025-07-01 PROCEDURE — 85018 HEMOGLOBIN: CPT | Performed by: OBSTETRICS & GYNECOLOGY

## 2025-07-01 PROCEDURE — 250N000011 HC RX IP 250 OP 636: Performed by: OBSTETRICS & GYNECOLOGY

## 2025-07-01 PROCEDURE — 258N000003 HC RX IP 258 OP 636: Performed by: OBSTETRICS & GYNECOLOGY

## 2025-07-01 PROCEDURE — 36415 COLL VENOUS BLD VENIPUNCTURE: CPT | Performed by: OBSTETRICS & GYNECOLOGY

## 2025-07-01 PROCEDURE — 250N000013 HC RX MED GY IP 250 OP 250 PS 637: Performed by: OBSTETRICS & GYNECOLOGY

## 2025-07-01 RX ORDER — CIPROFLOXACIN 500 MG/1
500 TABLET, FILM COATED ORAL 2 TIMES DAILY
Qty: 10 TABLET | Refills: 0 | Status: SHIPPED | OUTPATIENT
Start: 2025-07-01

## 2025-07-01 RX ORDER — POLYETHYLENE GLYCOL 3350 17 G/17G
1 POWDER, FOR SOLUTION ORAL DAILY
Qty: 527 G | Refills: 0 | Status: SHIPPED | OUTPATIENT
Start: 2025-07-01

## 2025-07-01 RX ORDER — IBUPROFEN 600 MG/1
600 TABLET, FILM COATED ORAL EVERY 6 HOURS PRN
Qty: 30 TABLET | Refills: 0 | Status: SHIPPED | OUTPATIENT
Start: 2025-07-01

## 2025-07-01 RX ORDER — HYDROMORPHONE HYDROCHLORIDE 2 MG/1
2 TABLET ORAL EVERY 6 HOURS PRN
Qty: 20 TABLET | Refills: 0 | Status: SHIPPED | OUTPATIENT
Start: 2025-07-01 | End: 2025-07-04

## 2025-07-01 RX ADMIN — PIPERACILLIN AND TAZOBACTAM 3.38 G: 3; .375 INJECTION, POWDER, FOR SOLUTION INTRAVENOUS at 06:09

## 2025-07-01 RX ADMIN — KETOROLAC TROMETHAMINE 30 MG: 30 INJECTION, SOLUTION INTRAMUSCULAR at 03:55

## 2025-07-01 RX ADMIN — PIPERACILLIN AND TAZOBACTAM 3.38 G: 3; .375 INJECTION, POWDER, FOR SOLUTION INTRAVENOUS at 00:15

## 2025-07-01 RX ADMIN — SIMETHICONE 80 MG: 80 TABLET, CHEWABLE ORAL at 08:15

## 2025-07-01 RX ADMIN — ACETAMINOPHEN 650 MG: 325 TABLET ORAL at 00:14

## 2025-07-01 RX ADMIN — SODIUM CHLORIDE: 0.9 INJECTION, SOLUTION INTRAVENOUS at 03:57

## 2025-07-01 RX ADMIN — KETOROLAC TROMETHAMINE 30 MG: 30 INJECTION, SOLUTION INTRAMUSCULAR at 11:17

## 2025-07-01 RX ADMIN — PIPERACILLIN AND TAZOBACTAM 3.38 G: 3; .375 INJECTION, POWDER, FOR SOLUTION INTRAVENOUS at 11:18

## 2025-07-01 RX ADMIN — ACETAMINOPHEN 650 MG: 325 TABLET ORAL at 06:09

## 2025-07-01 RX ADMIN — POLYETHYLENE GLYCOL 3350 17 G: 17 POWDER, FOR SOLUTION ORAL at 08:16

## 2025-07-01 RX ADMIN — DILTIAZEM HYDROCHLORIDE 120 MG: 120 CAPSULE, COATED, EXTENDED RELEASE ORAL at 08:16

## 2025-07-01 ASSESSMENT — ACTIVITIES OF DAILY LIVING (ADL)
ADLS_ACUITY_SCORE: 31

## 2025-07-01 NOTE — PLAN OF CARE
VSS and WDL. Incisions clean, dry and intact.  Montilla patent and draining light yellow urine.  Maintenance IV fluids running. Tolerating PO fluids as well. Ambulated 2x overnight, moves well, minimal pain.  Pain well managed with tylenol and toradol.      Goal Outcome Evaluation:      Plan of Care Reviewed With: patient    Overall Patient Progress: improving      Problem: Adult Inpatient Plan of Care  Goal: Plan of Care Review  Description: The Plan of Care Review/Shift note should be completed every shift.  The Outcome Evaluation is a brief statement about your assessment that the patient is improving, declining, or no change.  This information will be displayed automatically on your shift  note.  Outcome: Progressing  Flowsheets (Taken 7/1/2025 0423)  Plan of Care Reviewed With: patient  Overall Patient Progress: improving  Goal: Absence of Hospital-Acquired Illness or Injury  Intervention: Prevent Skin Injury  Recent Flowsheet Documentation  Taken 7/1/2025 0018 by Lauren Diaz RN  Body Position: position changed independently  Intervention: Prevent and Manage VTE (Venous Thromboembolism) Risk  Recent Flowsheet Documentation  Taken 7/1/2025 0018 by Lauren Diaz RN  VTE Prevention/Management: SCDs on (sequential compression devices)  Goal: Optimal Comfort and Wellbeing  Intervention: Provide Person-Centered Care  Recent Flowsheet Documentation  Taken 7/1/2025 0018 by Lauren Diaz RN  Trust Relationship/Rapport:   care explained   emotional support provided   questions answered     Problem: Skin Injury Risk Increased  Goal: Skin Health and Integrity  Intervention: Optimize Skin Protection  Recent Flowsheet Documentation  Taken 7/1/2025 0018 by Lauren Diaz RN  Activity Management:   activity adjusted per tolerance   ambulated outside room   ambulated in room   activity encouraged  Head of Bed (HOB) Positioning: HOB at 30-45 degrees     Problem: Hysterectomy Total or Partial  Goal: Effective Oxygenation  and Ventilation  Intervention: Optimize Oxygenation and Ventilation  Recent Flowsheet Documentation  Taken 7/1/2025 0018 by Lauren Diaz, RN  Activity Management:   activity adjusted per tolerance   ambulated outside room   ambulated in room   activity encouraged  Head of Bed (HOB) Positioning: HOB at 30-45 degrees      Curvilinear Excision Additional Text (Leave Blank If You Do Not Want): The margin was drawn around the clinically apparent lesion.  A curvilinear shape was then drawn on the skin incorporating the lesion and margins.  Incisions were then made along these lines to the appropriate tissue plane and the lesion was extirpated.

## 2025-07-01 NOTE — PLAN OF CARE
"Goal Outcome Evaluation:           Overall Patient Progress: improvingOverall Patient Progress: improving     Passed voiding trial, 300ml instilled - voided 200ml.  Attempted to void after voiding trial and was unable.  Bladder scanned for 300ml. Montilla catheter placed and will stay in place until patients follow up visit on 7/3. Educated patient on catheter care, all questions and concerns answered.  Prescription filled and meds given to patient. Understands when to follow up in clinic. Discharged at 1335.      Problem: Adult Inpatient Plan of Care  Goal: Plan of Care Review  Description: The Plan of Care Review/Shift note should be completed every shift.  The Outcome Evaluation is a brief statement about your assessment that the patient is improving, declining, or no change.  This information will be displayed automatically on your shift  note.  Outcome: Met  Flowsheets (Taken 7/1/2025 1046)  Overall Patient Progress: improving  Goal: Patient-Specific Goal (Individualized)  Description: You can add care plan individualizations to a care plan. Examples of Individualization might be:  \"Parent requests to be called daily at 9am for status\", \"I have a hard time hearing out of my right ear\", or \"Do not touch me to wake me up as it startles  me\".  Outcome: Met  Goal: Absence of Hospital-Acquired Illness or Injury  Outcome: Met  Intervention: Prevent Skin Injury  Recent Flowsheet Documentation  Taken 7/1/2025 0821 by Ella Winslow RN  Body Position: position changed independently  Intervention: Prevent and Manage VTE (Venous Thromboembolism) Risk  Recent Flowsheet Documentation  Taken 7/1/2025 0821 by Ella Winslow RN  VTE Prevention/Management: SCDs on (sequential compression devices)  Goal: Optimal Comfort and Wellbeing  Outcome: Met  Intervention: Provide Person-Centered Care  Recent Flowsheet Documentation  Taken 7/1/2025 0821 by Ella Winslow RN  Trust Relationship/Rapport:   care explained   emotional " support provided   questions answered  Goal: Readiness for Transition of Care  Outcome: Met  Flowsheets (Taken 7/1/2025 1000)  Transportation Anticipated: family or friend will provide  Concerns to be Addressed: all concerns addressed in this encounter  Intervention: Mutually Develop Transition Plan  Recent Flowsheet Documentation  Taken 7/1/2025 1000 by Ella Winslow, RN  Transportation Anticipated: family or friend will provide  Transportation Concerns: none  Concerns to be Addressed: all concerns addressed in this encounter  Patient/Family Anticipated Services at Transition: none  Patient/Family Anticipates Transition to: home with family  Equipment Currently Used at Home: none     Problem: Skin Injury Risk Increased  Goal: Skin Health and Integrity  Outcome: Met  Intervention: Optimize Skin Protection  Recent Flowsheet Documentation  Taken 7/1/2025 0821 by Ella Winslow, RN  Activity Management:   activity adjusted per tolerance   ambulated outside room   ambulated in room   activity encouraged  Head of Bed (HOB) Positioning: HOB at 30-45 degrees     Problem: Hysterectomy Total or Partial  Goal: Absence of Bleeding  Outcome: Met  Goal: Effective Bowel Elimination  Outcome: Met  Goal: Fluid and Electrolyte Balance  Outcome: Met  Goal: Absence of Infection Signs and Symptoms  Outcome: Met  Goal: Anesthesia/Sedation Recovery  Outcome: Met  Goal: Acceptable Pain Control  Outcome: Met  Goal: Nausea and Vomiting Relief  Outcome: Met  Goal: Effective Urinary Elimination  Outcome: Met  Goal: Effective Oxygenation and Ventilation  Outcome: Met  Intervention: Optimize Oxygenation and Ventilation  Recent Flowsheet Documentation  Taken 7/1/2025 0821 by Ella Winslow, RN  Activity Management:   activity adjusted per tolerance   ambulated outside room   ambulated in room   activity encouraged  Head of Bed (HOB) Positioning: HOB at 30-45 degrees

## 2025-07-01 NOTE — PROGRESS NOTES
"UROGYNECOLOGY    POST-OP DAY #1    S: Doing well overall  Ambulating: In room and halls  Diet: Tolerating PO, no N/V  Flatus: Feels flatus  Pain control: Well controlled, some cramping  Vaginal Pack: In place  Montilla catheter: In place    O:  Vitals: /84 (BP Location: Left arm, Patient Position: Semi-Montano's, Cuff Size: Adult Regular)   Pulse 75   Temp 98  F (36.7  C) (Oral)   Resp 18   Ht 1.626 m (5' 4\")   Wt 81.3 kg (179 lb 4.8 oz)   LMP 06/16/2025   SpO2 98%   Breastfeeding No   BMI 30.78 kg/m    BMI= Body mass index is 30.78 kg/m .      Intake/Output Summary (Last 24 hours) at 7/1/2025 0721  Last data filed at 7/1/2025 0633  Gross per 24 hour   Intake 4450 ml   Output 4005 ml   Net 445 ml       Exam:  Appears healthy and well, A&O x3  Abdomen is soft, slight bloating, incisions C/D/I, good BS  Ext SCD, no edema  Montilla catheter in place  Vaginal packing in place  No perineal edema, incisions intact    Labs:  HGB:  pre-op 13.0   Post-op 12.8    Assessment and Plan:  POD# 1  A) Post-Operative Care:  Ambulate   ADAT  Continue with pain control strategies.  Perform voiding trial when able to ambulate without assistance.  I reviewed post-operative instructions and precautions/ written information provided.  Discharge home anticipated today  Follow-up based on findings of voiding trial.    Hanna Hurst PA-C  "

## 2025-07-01 NOTE — PLAN OF CARE
"Goal Outcome Evaluation:      Plan of Care Reviewed With: patient    Overall Patient Progress: improvingOverall Patient Progress: improving       Dangled at bedside . Stood up and ambulated  on hallway . Kacy care done. Good urine output .       Problem: Adult Inpatient Plan of Care  Goal: Plan of Care Review  Description: The Plan of Care Review/Shift note should be completed every shift.  The Outcome Evaluation is a brief statement about your assessment that the patient is improving, declining, or no change.  This information will be displayed automatically on your shift  note.  6/30/2025 2043 by Griselda Leigh RN  Outcome: Progressing  Flowsheets (Taken 6/30/2025 2043)  Plan of Care Reviewed With: patient  Overall Patient Progress: improving  6/30/2025 1832 by Griselda Leigh RN  Outcome: Progressing  Flowsheets (Taken 6/30/2025 1832)  Plan of Care Reviewed With: patient  Overall Patient Progress: improving  Goal: Patient-Specific Goal (Individualized)  Description: You can add care plan individualizations to a care plan. Examples of Individualization might be:  \"Parent requests to be called daily at 9am for status\", \"I have a hard time hearing out of my right ear\", or \"Do not touch me to wake me up as it startles  me\".  6/30/2025 2043 by Griselda Leigh RN  Outcome: Progressing  6/30/2025 1832 by Griselda Leigh RN  Outcome: Progressing  Goal: Absence of Hospital-Acquired Illness or Injury  6/30/2025 2043 by Griselda Leigh RN  Outcome: Progressing  6/30/2025 1832 by Griselda Leigh RN  Outcome: Progressing  Intervention: Prevent Skin Injury  Recent Flowsheet Documentation  Taken 6/30/2025 1812 by Griselda Legih RN  Body Position: position changed independently  Intervention: Prevent and Manage VTE (Venous Thromboembolism) Risk  Recent Flowsheet Documentation  Taken 6/30/2025 2015 by Griselda Leigh RN  VTE Prevention/Management: SCDs on (sequential compression devices)  Taken 6/30/2025 " 1812 by Griselda Leigh RN  VTE Prevention/Management: SCDs on (sequential compression devices)  Goal: Optimal Comfort and Wellbeing  6/30/2025 2043 by Griselda Leigh RN  Outcome: Progressing  6/30/2025 1832 by Griselda Leigh RN  Outcome: Progressing  Intervention: Monitor Pain and Promote Comfort  Recent Flowsheet Documentation  Taken 6/30/2025 2015 by Griselda Leigh RN  Pain Management Interventions: cold applied  Taken 6/30/2025 1812 by Griselda Leigh RN  Pain Management Interventions: medication (see MAR)  Intervention: Provide Person-Centered Care  Recent Flowsheet Documentation  Taken 6/30/2025 1812 by Griselda Leigh RN  Trust Relationship/Rapport: care explained  Goal: Readiness for Transition of Care  6/30/2025 2043 by Griselda Leigh RN  Outcome: Progressing  6/30/2025 1832 by Griselda Leigh RN  Outcome: Progressing  Intervention: Mutually Develop Transition Plan  Recent Flowsheet Documentation  Taken 6/30/2025 1800 by Griselda Leigh RN  Equipment Currently Used at Home: none     Problem: Skin Injury Risk Increased  Goal: Skin Health and Integrity  6/30/2025 2043 by Griselda Leigh RN  Outcome: Progressing  6/30/2025 1832 by Griselda Leigh RN  Outcome: Progressing  Intervention: Optimize Skin Protection  Recent Flowsheet Documentation  Taken 6/30/2025 2015 by Griselda Leigh RN  Activity Management:   activity adjusted per tolerance   ambulated outside room   ambulated in room   activity encouraged  Taken 6/30/2025 1812 by Griselda Leigh RN  Activity Management: activity adjusted per tolerance  Head of Bed (HOB) Positioning: HOB at 30-45 degrees     Problem: Hysterectomy Total or Partial  Goal: Absence of Bleeding  Outcome: Progressing  Goal: Effective Bowel Elimination  Outcome: Progressing  Goal: Fluid and Electrolyte Balance  Outcome: Progressing  Goal: Absence of Infection Signs and Symptoms  Outcome: Progressing  Goal: Anesthesia/Sedation  Recovery  Outcome: Progressing  Goal: Acceptable Pain Control  Outcome: Progressing  Intervention: Prevent or Manage Pain  Recent Flowsheet Documentation  Taken 6/30/2025 2015 by Griselda Leigh RN  Pain Management Interventions: cold applied  Taken 6/30/2025 1812 by Griselda Leigh, RN  Pain Management Interventions: medication (see MAR)  Goal: Nausea and Vomiting Relief  Outcome: Progressing  Goal: Effective Urinary Elimination  Outcome: Progressing  Goal: Effective Oxygenation and Ventilation  Outcome: Progressing  Intervention: Optimize Oxygenation and Ventilation  Recent Flowsheet Documentation  Taken 6/30/2025 2015 by Griselda Leigh, RN  Activity Management:   activity adjusted per tolerance   ambulated outside room   ambulated in room   activity encouraged  Taken 6/30/2025 1812 by Griselda Leigh, RN  Activity Management: activity adjusted per tolerance  Head of Bed (HOB) Positioning: HOB at 30-45 degrees

## 2025-07-01 NOTE — DISCHARGE INSTRUCTIONS
[facility]  Prolapse/Pelvic Reconstructive Surgery  Instructions for Caring for yourself after Surgery     How do I manage my pain?   Pain and tenderness should lessen each day. To help keep pain under control, use the following guidelines:  Apply ice packs to your perineum (vaginal and rectal area) for the 1st couple of days.  Take 600 milligrams (mg) of ibuprofen (Advil) every 6 hours for the 1st several days.   Use your prescribed narcotic (hydromorphone) for additional pain relief as needed.  Do not drive, drink alcohol or make any major decisions, such as signing important papers or managing legal issues, while taking prescription pain medication.   Take pain medication with food to avoid an upset stomach.    How do I care for my perineum?  Use pads for vaginal discharge after surgery. Discharge is normal and can last several weeks. Discharge may appear bloody, yellow or white.  Do not place anything in your vagina until advised by your doctor.     What about bathing?     Do not take a tub bath, use a hot tub or swim until advised by your doctor. You may take showers.    What about bowel and bladder management?  Keep stools soft and regular. We recommend using the following medicine that loosens stools and increases bowel movements:  MiraLAX-  17 grams or a capful daily following surgery.    When urinating, do not bear down. Relax and allow the bladder muscle to contract. If you are unable to urinate, contact your doctor.  If you go home with a catheter, your doctor may prescribe an antibiotic for you to take before bed to help prevent infection. Follow up in the clinic as instructed to have the catheter removed.    What about activity?  Do not lift more than 10 pounds for 6 weeks after surgery. Avoid heavy pushing or pulling, such as vacuuming or lawn mowing. Your body s tissues need time to heal and regain maximum strength.  Keep Active. Walking is encouraged. Gradually build up how long and far you  walk. Climbing stairs is OK if able.      You may resume driving when you are no longer taking narcotic pain medication and have the strength to use the brake pedal as needed.     When do I call my doctor?  Call Dr. Dimas call [phone] if you have:     (for urgent questions/concerns CELL PHONE [phone])  -Any post-operative questions or concerns   -A fever over 100.4 F (38 C)    -Difficulty emptying your bladder  -Chills       -Worsening pain              -Nausea or vomiting

## 2025-07-01 NOTE — PHARMACY-ADMISSION MEDICATION HISTORY
Med rec completed by preadmitting RN Reviewed by Erma Narvaez, RN (Registered Nurse) on 06/23/25 at 1220     Sure script fill history and H&P both show only Diltiazem as RX med - no further clarifications noted      Medication History Completed By: Mariah Cruz [facility] 7/1/2025 7:41 AM    PTA Med List   Medication Sig Last Dose/Taking    Ashwagandha 120 MG CAPS Take by mouth. Past Month    Cholecalciferol (VITAMIN D-3 PO) Take by mouth every 7 days. Unsure dosage, otc Past Month    co-enzyme Q-10 100 MG CAPS capsule Take 100 mg by mouth daily. Past Month    diltiazem ER (DILT-XR) 120 MG 24 hr capsule Take 1 capsule (120 mg) by mouth daily. 6/30/2025 Morning    Flaxseed, Linseed, (FLAX SEED OIL) 1000 MG capsule Take 1 capsule by mouth daily. Past Month    magnesium 250 MG tablet Take 1 tablet by mouth daily Past Month    Turmeric 5-1000 MG CAPS Take by mouth. Past Month    vitamin C with B complex (B COMPLEX-C) tablet Take 1 tablet by mouth daily Past Month

## 2025-07-02 LAB
PATH REPORT.COMMENTS IMP SPEC: NORMAL
PATH REPORT.COMMENTS IMP SPEC: NORMAL
PATH REPORT.FINAL DX SPEC: NORMAL
PATH REPORT.GROSS SPEC: NORMAL
PATH REPORT.INTRAOP OBS SPEC DOC: NORMAL
PATH REPORT.MICROSCOPIC SPEC OTHER STN: NORMAL
PATH REPORT.RELEVANT HX SPEC: NORMAL
PHOTO IMAGE: NORMAL

## 2025-07-04 ENCOUNTER — HOSPITAL ENCOUNTER (EMERGENCY)
Facility: CLINIC | Age: 45
Discharge: HOME | End: 2025-07-04
Attending: EMERGENCY MEDICINE | Admitting: EMERGENCY MEDICINE
Payer: COMMERCIAL

## 2025-07-04 VITALS
HEIGHT: 64 IN | RESPIRATION RATE: 18 BRPM | HEART RATE: 76 BPM | TEMPERATURE: 97.4 F | SYSTOLIC BLOOD PRESSURE: 140 MMHG | WEIGHT: 178 LBS | BODY MASS INDEX: 30.39 KG/M2 | OXYGEN SATURATION: 99 % | DIASTOLIC BLOOD PRESSURE: 88 MMHG

## 2025-07-04 DIAGNOSIS — R33.9 URINARY RETENTION: ICD-10-CM

## 2025-07-04 LAB
ALBUMIN UR-MCNC: NEGATIVE MG/DL
APPEARANCE UR: CLEAR
BILIRUB UR QL STRIP: NEGATIVE
COLOR UR AUTO: NORMAL
GLUCOSE UR STRIP-MCNC: NEGATIVE MG/DL
HGB UR QL STRIP: NEGATIVE
KETONES UR STRIP-MCNC: NEGATIVE MG/DL
LEUKOCYTE ESTERASE UR QL STRIP: NEGATIVE
NITRATE UR QL: NEGATIVE
PH UR STRIP: 6 [PH] (ref 5–7)
SP GR UR STRIP: 1.01 (ref 1–1.03)
UROBILINOGEN UR STRIP-MCNC: NORMAL MG/DL

## 2025-07-04 PROCEDURE — 99284 EMERGENCY DEPT VISIT MOD MDM: CPT

## 2025-07-04 PROCEDURE — 81003 URINALYSIS AUTO W/O SCOPE: CPT | Performed by: EMERGENCY MEDICINE

## 2025-07-04 PROCEDURE — 51702 INSERT TEMP BLADDER CATH: CPT

## 2025-07-04 RX ORDER — TAMSULOSIN HYDROCHLORIDE 0.4 MG/1
0.4 CAPSULE ORAL DAILY
Qty: 3 CAPSULE | Refills: 0 | Status: SHIPPED | OUTPATIENT
Start: 2025-07-04 | End: 2025-07-07

## 2025-07-04 ASSESSMENT — ACTIVITIES OF DAILY LIVING (ADL): ADLS_ACUITY_SCORE: 52

## 2025-07-04 NOTE — ED PROVIDER NOTES
"  Emergency Department Note      History of Present Illness     Chief Complaint   Urinary Retention      HPI   Cora Connell is a 44 year old female presenting 4 days post hysterectomy and bladder sling with urinary retention. The patient reports that she had her catheter removed yesterday and has not been able to urinate. She called her surgeon and was referred to /ED for catheter insertion. She has some nausea but thinks this may just be from needing to urinate. Pain from surgery is okay. Denies fever.    Independent Historian   None    Review of External Notes   Reviewed patient's surgical history, the patient had a robotic laparoscopic sacral colpopexy and supracervical hysterectomy as well as a bilateral ovarian cystectomy and abdominal enterovesical repair.    Past Medical History     Medical History and Problem List   PVCs (premature ventricular contractions)    Medications   diltiazem (Diltia XT)    Surgical History   Ablation, PVCs  Cystectomy, ovarian, right  Excise lesion, upper extremity, left  Hysterectomy, supracervical  Leep procedure, cervical  Tonsillectomy    Physical Exam     Patient Vitals for the past 24 hrs:   BP Temp Temp src Pulse Resp SpO2 Height Weight   07/04/25 1020 -- -- -- -- 18 -- -- --   07/04/25 1019 (!) 140/88 97.4  F (36.3  C) Temporal 76 -- 99 % 1.626 m (5' 4\") 80.7 kg (178 lb)     Physical Exam  General: Well-nourished, resting comfortably when I enter the room  Eyes: Pupils equal, conjunctivae pink no scleral icterus or conjunctival injection  ENT:  Moist mucus membranes  Respiratory:  Lungs clear to auscultation bilaterally, no crackles/rubs/wheezes.  Good air movement  CV: Normal rate and rhythm, no murmurs  GI:  Abdomen soft and non-distended.  No guarding or rebound.  Mild tenderness to palpation in the suprapubic region.  Skin: Warm, dry.  No rashes or petechiae  Musculoskeletal: No peripheral edema or calf tenderness  Neuro: Alert and oriented to " person/place/time  Psychiatric: Normal affect      Diagnostics     Lab Results   Labs Ordered and Resulted from Time of ED Arrival to Time of ED Departure   UA MACROSCOPIC WITH REFLEX TO MICRO AND CULTURE - Normal       Result Value    Color Urine Light Yellow      Appearance Urine Clear      Glucose Urine Negative      Bilirubin Urine Negative      Ketones Urine Negative      Specific Gravity Urine 1.009      Blood Urine Negative      pH Urine 6.0      Protein Albumin Urine Negative      Urobilinogen Urine Normal      Nitrite Urine Negative      Leukocyte Esterase Urine Negative         Imaging   No orders to display       Independent Interpretation   None    ED Course      Medications Administered   Medications - No data to display    Procedures   None     Discussion of Management   None    ED Course   ED Course as of 07/04/25 1654 Fri Jul 04, 2025   1134 I evaluated the patient and obtained history.     1215 I rechecked and updated the patient.     1231 Updated the patient again. She was comfortable with discharge.       Additional Documentation  None    Medical Decision Making / Diagnosis     CMS Diagnoses: None    MIPS   Montilla catheter was inserted because acute urinary retention/bladder outlet obstruction.     KENDALL Connell is a 44 year old female presenting to the emergency department with a complaint of urinary retention status post hysterectomy and bladder sling.  On exam patient is awake, alert, answering questions appropriately.  She is not in any acute distress.  She has slight fullness and tenderness to palpation in the suprapubic abdomen.  Bladder scan is done which shows 800 mL of urine.  Montilla catheter is placed.  Patient feels much better.  No signs of UTI.  She is seeing her OB/GYN on Monday and will have the Montilla reevaluated then.    Disposition   The patient was discharged.     Diagnosis     ICD-10-CM    1. Urinary retention  R33.9            Discharge Medications   Discharge  Medication List as of 7/4/2025 12:38 PM        START taking these medications    Details   tamsulosin (FLOMAX) 0.4 MG capsule Take 1 capsule (0.4 mg) by mouth daily for 3 doses., Disp-3 capsule, R-0, E-Prescribe               Scribe Disclosure:  Hanna TRAN, am serving as a scribe at 11:34 AM on 7/4/2025 to document services personally performed by Dede Olvera MD based on my observations and the provider's statements to me.        Dede Olvera MD  07/04/25 [id number]

## 2025-07-04 NOTE — DISCHARGE INSTRUCTIONS
Please follow-up with your OB on Monday.  You can take Flomax for the next 3 days.  Please come back if your Montilla catheter does not have any output and you are having abdominal pain.

## 2025-07-04 NOTE — ED TRIAGE NOTES
Pt states she had hysterectomy last week on Monday, jacobs catheter taken out yesterday and has not been able to void. Called her surgeon and was advised to come to ED for new jacobs placement

## (undated) DEVICE — SYSTEM PSTN 29X20X1IN PNK PD LF NS 40580 (COI)

## (undated) DEVICE — SOL NACL 0.9% IRRIG 1000ML BOTTLE 07138-09

## (undated) DEVICE — SUCTION IRR STRYKERFLOW II W/TIP 250-070-520

## (undated) DEVICE — Device

## (undated) DEVICE — GLOVE PROTEXIS BLUE W/NEU-THERA 6.5  2D73EB65

## (undated) DEVICE — SUCTION MANIFOLD NEPTUNE 2 SYS 4 PORT 0702-020-000

## (undated) DEVICE — ENDO TROCAR FIRST ENTRY KII FIOS ADV FIX 05X100MM CFF03

## (undated) DEVICE — SPONGE RAY-TEC 4X8" 7318

## (undated) DEVICE — CATH EP CATH EP REPRO DAIG RSPN FX CRV DX EP C

## (undated) DEVICE — DEVICE CLOSURE VASCADE PERCUTANEOUS 800-612C-10U

## (undated) DEVICE — DRAPE VAGI BAG 18X9" 1072

## (undated) DEVICE — SUTURE VICRYL+ 2-0 CT-2 27" UND VCP269H

## (undated) DEVICE — SOLUTION IV WATER 3L HYPOTONIC R8006

## (undated) DEVICE — ENDO TROCAR FIRST ENTRY KII FIOS Z-THRD 11X100MM CTF33

## (undated) DEVICE — NDL INSUFFLATION 13GA 120MM C2201

## (undated) DEVICE — SUCTION CANISTER MEDIVAC LINER 3000ML W/LID 65651-530

## (undated) DEVICE — ENDO ACCESS PLATFORM GELPOINT SGL INCISION CNGL2

## (undated) DEVICE — DRAPE MAYO STAND 23X54 8337

## (undated) DEVICE — DAVINCI XI OBTURATOR BLADELESS 8MM 470359

## (undated) DEVICE — CATH FOLEY 16FR 5ML LUBRICATH LATEX 0165L16

## (undated) DEVICE — KIT PATIENT POSITIONING PIGAZZI LATEX FREE 40580

## (undated) DEVICE — GLOVE PROTEXIS BLUE W/NEU-THERA 7.0  2D73EB70

## (undated) DEVICE — SU VICRYL 0 CT-1 27" UND J260H

## (undated) DEVICE — PATCH CARTO 3 EXTERNAL REFERENCE 3D MAPPING CREFP6

## (undated) DEVICE — SU DERMABOND ADVANCED .7ML DNX12

## (undated) DEVICE — MINOR SINGLE BASIN KIT

## (undated) DEVICE — UTERINE MANIPULATOR RUMI TIP SACROCOLPOPEXY DST END SACRO-1C

## (undated) DEVICE — PACK DAVINCI UROLOGY SBA15UDFSG

## (undated) DEVICE — ESU HOLDER LAP INST DISP PURPLE LONG 330MM H-PRO-330

## (undated) DEVICE — UTERINE MANIPULATOR RUMI 6.7MMX6CM UMW676

## (undated) DEVICE — DEVICE SUTURE GRASPER TROCAR CLOSURE 14GA PMITCSG

## (undated) DEVICE — TUBING IRR LG BORE TUBE DRIP CHMBR 2 BG 94IN 313003

## (undated) DEVICE — ADAPTER DRAPE ALLY AU-AD

## (undated) DEVICE — SU VICRYL+ 4-0 UNDYED PS-2 VCP496ZH

## (undated) DEVICE — PROTECTOR ARM ONE-STEP TRENDELENBURG 40418 (COI)

## (undated) DEVICE — ESU CORD MONOPOLAR 10'  E0510

## (undated) DEVICE — UTERINE MANIPULATOR RUMI 5.1MMX6CM UML516

## (undated) DEVICE — SPONGE RAY-TEC 3X3" 30-094

## (undated) DEVICE — DAVINCI XI DRAPE COLUMN 470341

## (undated) DEVICE — ENDO POUCH UNIV RETRIEVAL SYSTEM INZII 10MM CD001

## (undated) DEVICE — INTRODUCER SHEATH GREEN 6.5FRX11CM .038IN PSI-6F-11-038ACT

## (undated) DEVICE — PREP CHLORAPREP 26ML TINTED ORANGE  260815

## (undated) DEVICE — SUCTION TIP YANKAUER W/O VENT K86

## (undated) DEVICE — ENDO TROCAR FIRST ENTRY KII FIOS Z-THRD 05X100MM CTF03

## (undated) DEVICE — SU MONOCRYL 4-0 PS-2 18" UND Y496G

## (undated) DEVICE — UNDERPAD 36X30 PREMIERPRO MAX ABS NS LF 676111

## (undated) DEVICE — PACK EP SRG PROC LF DISP SAN32EPFSR

## (undated) DEVICE — SOL RINGERS LACTATED 1000ML BAG 2B2324X

## (undated) DEVICE — SU MONOCRYL 0 CT-2 27" Y334H

## (undated) DEVICE — INTRO SHEATH 7FRX10CM PINNACLE RSS702

## (undated) DEVICE — UTERINE MANIPULATOR RUMI 6.7MMX10CM UMG670

## (undated) DEVICE — GLOVE PROTEXIS BLUE W/NEU-THERA 6.0  2D73EB60

## (undated) DEVICE — DAVINCI CONMED AIRSEAL CAP & OBTURATOR BLADELESS 8MM IAS8-DV

## (undated) DEVICE — PREP DYNA-HEX 4% CHG SCRUB 4OZ BOTTLE MDS098710

## (undated) DEVICE — DEFIB PRO-PADZ LVP LQD GEL ADULT 8900-2105-01

## (undated) DEVICE — SUCTION IRR TRUMPET VALVE LAP ASU1201

## (undated) DEVICE — DAVINCI XI DRAPE ARM 470015

## (undated) DEVICE — LINEN TOWEL PACK X5 5464

## (undated) DEVICE — SPONGE PACK VAGINAL 2"X9

## (undated) DEVICE — ESU GROUND PAD UNIVERSAL W/O CORD

## (undated) DEVICE — LINEN ORTHO ACL PACK 5447

## (undated) DEVICE — ANTIFOG SOLUTION SEE SHARP 150M TROCAR SWABS 30978 (COI)

## (undated) DEVICE — DAVINCI XI SEAL UNIVERSAL 5-12MM 470500

## (undated) DEVICE — DRAPE UNDER BUTTOCK 89415

## (undated) DEVICE — SOLUTION IV 0.9% NACL 1000ML E8000

## (undated) DEVICE — SU DERMABOND MINI DHVM12

## (undated) DEVICE — UTERINE MANIPULATOR RUMI 6.7MMX8CM UMB678

## (undated) DEVICE — PAD PERI INDIV WRAP 11" 2022A

## (undated) DEVICE — TUBE SET SMARKABLATE IRRIGATION

## (undated) DEVICE — GLOVE PROTEXIS W/NEU-THERA 6.5  2D73TE65

## (undated) DEVICE — ESU GROUND PAD ADULT W/CORD E7507

## (undated) DEVICE — TUBING C02 INSUFFLATION LAP FILTER HEATER 6198

## (undated) DEVICE — ENDO POUCH UNIVERSAL RETRIEVAL SYSTEM INZII 5MM CD003

## (undated) DEVICE — SOL WATER IRRIG 1000ML BOTTLE 07139-09

## (undated) DEVICE — DAVINCI HOT SHEARS TIP COVER  400180

## (undated) DEVICE — NDL INSUFFLATION 14GA STEP S100000

## (undated) DEVICE — DAVINCI CONMED AIRSEAL BIFURCATRED TUBE SET ASM-EVAC1-BI

## (undated) DEVICE — SU PDS II 2-0 CT-2 27"  Z333H

## (undated) DEVICE — CATH EP 7FR X 115CM DECANAV CA

## (undated) RX ORDER — ONDANSETRON 2 MG/ML
INJECTION INTRAMUSCULAR; INTRAVENOUS
Status: DISPENSED
Start: 2018-02-09

## (undated) RX ORDER — DEXAMETHASONE SODIUM PHOSPHATE 4 MG/ML
INJECTION, SOLUTION INTRA-ARTICULAR; INTRALESIONAL; INTRAMUSCULAR; INTRAVENOUS; SOFT TISSUE
Status: DISPENSED
Start: 2018-02-09

## (undated) RX ORDER — BUPIVACAINE HYDROCHLORIDE 5 MG/ML
INJECTION, SOLUTION EPIDURAL; INTRACAUDAL
Status: DISPENSED
Start: 2018-02-09

## (undated) RX ORDER — GLYCOPYRROLATE 0.2 MG/ML
INJECTION, SOLUTION INTRAMUSCULAR; INTRAVENOUS
Status: DISPENSED
Start: 2018-02-09

## (undated) RX ORDER — ACETAMINOPHEN 325 MG/1
TABLET ORAL
Status: DISPENSED
Start: 2025-06-30

## (undated) RX ORDER — DEXAMETHASONE SODIUM PHOSPHATE 4 MG/ML
INJECTION, SOLUTION INTRA-ARTICULAR; INTRALESIONAL; INTRAMUSCULAR; INTRAVENOUS; SOFT TISSUE
Status: DISPENSED
Start: 2025-06-30

## (undated) RX ORDER — CEFAZOLIN SODIUM/WATER 2 G/20 ML
SYRINGE (ML) INTRAVENOUS
Status: DISPENSED
Start: 2025-06-30

## (undated) RX ORDER — FENTANYL CITRATE 50 UG/ML
INJECTION, SOLUTION INTRAMUSCULAR; INTRAVENOUS
Status: DISPENSED
Start: 2025-06-30

## (undated) RX ORDER — LIDOCAINE HYDROCHLORIDE 10 MG/ML
INJECTION, SOLUTION EPIDURAL; INFILTRATION; INTRACAUDAL; PERINEURAL
Status: DISPENSED
Start: 2024-12-27

## (undated) RX ORDER — FENTANYL CITRATE 50 UG/ML
INJECTION, SOLUTION INTRAMUSCULAR; INTRAVENOUS
Status: DISPENSED
Start: 2018-02-09

## (undated) RX ORDER — PROPOFOL 10 MG/ML
INJECTION, EMULSION INTRAVENOUS
Status: DISPENSED
Start: 2018-02-09

## (undated) RX ORDER — HEPARIN SODIUM 200 [USP'U]/100ML
INJECTION, SOLUTION INTRAVENOUS
Status: DISPENSED
Start: 2024-12-27

## (undated) RX ORDER — VECURONIUM BROMIDE 1 MG/ML
INJECTION, POWDER, LYOPHILIZED, FOR SOLUTION INTRAVENOUS
Status: DISPENSED
Start: 2018-02-09

## (undated) RX ORDER — PROPOFOL 10 MG/ML
INJECTION, EMULSION INTRAVENOUS
Status: DISPENSED
Start: 2025-06-30

## (undated) RX ORDER — KETOROLAC TROMETHAMINE 30 MG/ML
INJECTION, SOLUTION INTRAMUSCULAR; INTRAVENOUS
Status: DISPENSED
Start: 2025-06-30

## (undated) RX ORDER — BUPIVACAINE HYDROCHLORIDE AND EPINEPHRINE 5; 5 MG/ML; UG/ML
INJECTION, SOLUTION EPIDURAL; INTRACAUDAL; PERINEURAL
Status: DISPENSED
Start: 2025-06-30

## (undated) RX ORDER — LIDOCAINE HYDROCHLORIDE 20 MG/ML
INJECTION, SOLUTION EPIDURAL; INFILTRATION; INTRACAUDAL; PERINEURAL
Status: DISPENSED
Start: 2018-02-09

## (undated) RX ORDER — OXYCODONE HYDROCHLORIDE 5 MG/1
TABLET ORAL
Status: DISPENSED
Start: 2025-06-30

## (undated) RX ORDER — ACETAMINOPHEN 500 MG
TABLET ORAL
Status: DISPENSED
Start: 2024-12-27

## (undated) RX ORDER — HYDROMORPHONE HYDROCHLORIDE 1 MG/ML
INJECTION, SOLUTION INTRAMUSCULAR; INTRAVENOUS; SUBCUTANEOUS
Status: DISPENSED
Start: 2018-02-09

## (undated) RX ORDER — ONDANSETRON 2 MG/ML
INJECTION INTRAMUSCULAR; INTRAVENOUS
Status: DISPENSED
Start: 2025-06-30

## (undated) RX ORDER — ACETAMINOPHEN 10 MG/ML
INJECTION, SOLUTION INTRAVENOUS
Status: DISPENSED
Start: 2018-02-09

## (undated) RX ORDER — BUPIVACAINE HYDROCHLORIDE 5 MG/ML
INJECTION, SOLUTION EPIDURAL; INTRACAUDAL; PERINEURAL
Status: DISPENSED
Start: 2025-06-30

## (undated) RX ORDER — LIDOCAINE HYDROCHLORIDE AND EPINEPHRINE 10; 10 MG/ML; UG/ML
INJECTION, SOLUTION INFILTRATION; PERINEURAL
Status: DISPENSED
Start: 2025-06-30

## (undated) RX ORDER — PHENAZOPYRIDINE HYDROCHLORIDE 200 MG/1
TABLET, FILM COATED ORAL
Status: DISPENSED
Start: 2025-06-30

## (undated) RX ORDER — KETOROLAC TROMETHAMINE 30 MG/ML
INJECTION, SOLUTION INTRAMUSCULAR; INTRAVENOUS
Status: DISPENSED
Start: 2018-02-09